# Patient Record
Sex: MALE | Race: WHITE | NOT HISPANIC OR LATINO | Employment: FULL TIME | URBAN - METROPOLITAN AREA
[De-identification: names, ages, dates, MRNs, and addresses within clinical notes are randomized per-mention and may not be internally consistent; named-entity substitution may affect disease eponyms.]

---

## 2017-11-27 ENCOUNTER — GENERIC CONVERSION - ENCOUNTER (OUTPATIENT)
Dept: OTHER | Facility: OTHER | Age: 37
End: 2017-11-27

## 2018-02-06 ENCOUNTER — ANESTHESIA EVENT (OUTPATIENT)
Dept: PERIOP | Facility: HOSPITAL | Age: 38
DRG: 470 | End: 2018-02-06
Payer: COMMERCIAL

## 2018-02-06 ENCOUNTER — TRANSCRIBE ORDERS (OUTPATIENT)
Dept: ADMINISTRATIVE | Facility: HOSPITAL | Age: 38
End: 2018-02-06

## 2018-02-06 ENCOUNTER — HOSPITAL ENCOUNTER (OUTPATIENT)
Dept: RADIOLOGY | Facility: HOSPITAL | Age: 38
Discharge: HOME | End: 2018-02-06
Attending: ORTHOPAEDIC SURGERY
Payer: COMMERCIAL

## 2018-02-06 ENCOUNTER — APPOINTMENT (OUTPATIENT)
Dept: LAB | Facility: HOSPITAL | Age: 38
End: 2018-02-06
Attending: ORTHOPAEDIC SURGERY
Payer: COMMERCIAL

## 2018-02-06 ENCOUNTER — HOSPITAL ENCOUNTER (OUTPATIENT)
Dept: NON INVASIVE DIAGNOSTICS | Facility: HOSPITAL | Age: 38
Discharge: HOME | End: 2018-02-06
Attending: ORTHOPAEDIC SURGERY
Payer: COMMERCIAL

## 2018-02-06 ENCOUNTER — APPOINTMENT (OUTPATIENT)
Dept: PREADMISSION TESTING | Facility: HOSPITAL | Age: 38
End: 2018-02-06
Payer: COMMERCIAL

## 2018-02-06 DIAGNOSIS — M17.11 OSTEOARTHRITIS OF RIGHT KNEE, UNSPECIFIED OSTEOARTHRITIS TYPE: ICD-10-CM

## 2018-02-06 DIAGNOSIS — Z01.818 PREOP EXAMINATION: Primary | ICD-10-CM

## 2018-02-06 DIAGNOSIS — Z01.818 PREOP EXAMINATION: ICD-10-CM

## 2018-02-06 LAB
ALBUMIN SERPL BCP-MCNC: 4.2 G/DL (ref 3.5–5)
ALP SERPL-CCNC: 89 U/L (ref 46–116)
ALT SERPL W P-5'-P-CCNC: 48 U/L (ref 12–78)
ANION GAP SERPL CALCULATED.3IONS-SCNC: 9 MMOL/L (ref 4–13)
AST SERPL W P-5'-P-CCNC: 25 U/L (ref 5–45)
ATRIAL RATE: 86 BPM
BASOPHILS # BLD AUTO: 0.03 THOUSANDS/ΜL (ref 0–0.1)
BASOPHILS NFR BLD AUTO: 1 % (ref 0–1)
BILIRUB SERPL-MCNC: 0.52 MG/DL (ref 0.2–1)
BILIRUB UR QL STRIP: NEGATIVE
BUN SERPL-MCNC: 13 MG/DL (ref 5–25)
CALCIUM SERPL-MCNC: 9.5 MG/DL (ref 8.3–10.1)
CHLORIDE SERPL-SCNC: 100 MMOL/L (ref 100–108)
CLARITY UR: CLEAR
CO2 SERPL-SCNC: 30 MMOL/L (ref 21–32)
COLOR UR: NORMAL
CREAT SERPL-MCNC: 0.91 MG/DL (ref 0.6–1.3)
EOSINOPHIL # BLD AUTO: 0.08 THOUSAND/ΜL (ref 0–0.61)
EOSINOPHIL NFR BLD AUTO: 2 % (ref 0–6)
ERYTHROCYTE [DISTWIDTH] IN BLOOD BY AUTOMATED COUNT: 13 % (ref 11.6–15.1)
GFR SERPL CREATININE-BSD FRML MDRD: 106 ML/MIN/1.73SQ M
GLUCOSE SERPL-MCNC: 113 MG/DL (ref 65–140)
GLUCOSE UR STRIP-MCNC: NEGATIVE MG/DL
HCT VFR BLD AUTO: 42.2 % (ref 36.5–49.3)
HGB BLD-MCNC: 14.9 G/DL (ref 12–17)
HGB UR QL STRIP.AUTO: NEGATIVE
INR PPP: 0.98 (ref 0.86–1.16)
KETONES UR STRIP-MCNC: NEGATIVE MG/DL
LEUKOCYTE ESTERASE UR QL STRIP: NEGATIVE
LYMPHOCYTES # BLD AUTO: 1.59 THOUSANDS/ΜL (ref 0.6–4.47)
LYMPHOCYTES NFR BLD AUTO: 30 % (ref 14–44)
MCH RBC QN AUTO: 29.3 PG (ref 26.8–34.3)
MCHC RBC AUTO-ENTMCNC: 35.3 G/DL (ref 31.4–37.4)
MCV RBC AUTO: 83 FL (ref 82–98)
MONOCYTES # BLD AUTO: 0.3 THOUSAND/ΜL (ref 0.17–1.22)
MONOCYTES NFR BLD AUTO: 6 % (ref 4–12)
NEUTROPHILS # BLD AUTO: 3.39 THOUSANDS/ΜL (ref 1.85–7.62)
NEUTS SEG NFR BLD AUTO: 61 % (ref 43–75)
NITRITE UR QL STRIP: NEGATIVE
NRBC BLD AUTO-RTO: 0 /100 WBCS
P AXIS: 31 DEGREES
PH UR STRIP.AUTO: 6 [PH] (ref 4.5–8)
PLATELET # BLD AUTO: 186 THOUSANDS/UL (ref 149–390)
PMV BLD AUTO: 11.7 FL (ref 8.9–12.7)
POTASSIUM SERPL-SCNC: 3.5 MMOL/L (ref 3.5–5.3)
PR INTERVAL: 148 MS
PROT SERPL-MCNC: 8 G/DL (ref 6.4–8.2)
PROT UR STRIP-MCNC: NEGATIVE MG/DL
PROTHROMBIN TIME: 13 SECONDS (ref 12.1–14.4)
QRS AXIS: 24 DEGREES
QRSD INTERVAL: 78 MS
QT INTERVAL: 360 MS
QTC INTERVAL: 430 MS
RBC # BLD AUTO: 5.09 MILLION/UL (ref 3.88–5.62)
SODIUM SERPL-SCNC: 139 MMOL/L (ref 136–145)
SP GR UR STRIP.AUTO: 1.01 (ref 1–1.03)
T WAVE AXIS: 17 DEGREES
UROBILINOGEN UR QL STRIP.AUTO: 0.2 E.U./DL
VENTRICULAR RATE: 86 BPM
WBC # BLD AUTO: 5.39 THOUSAND/UL (ref 4.31–10.16)

## 2018-02-06 PROCEDURE — 80053 COMPREHEN METABOLIC PANEL: CPT

## 2018-02-06 PROCEDURE — 93010 ELECTROCARDIOGRAM REPORT: CPT | Performed by: INTERNAL MEDICINE

## 2018-02-06 PROCEDURE — 85610 PROTHROMBIN TIME: CPT

## 2018-02-06 PROCEDURE — 81003 URINALYSIS AUTO W/O SCOPE: CPT | Performed by: ORTHOPAEDIC SURGERY

## 2018-02-06 PROCEDURE — 71046 X-RAY EXAM CHEST 2 VIEWS: CPT

## 2018-02-06 PROCEDURE — 36415 COLL VENOUS BLD VENIPUNCTURE: CPT

## 2018-02-06 PROCEDURE — 93005 ELECTROCARDIOGRAM TRACING: CPT

## 2018-02-06 PROCEDURE — 85025 COMPLETE CBC W/AUTO DIFF WBC: CPT

## 2018-02-06 RX ORDER — SODIUM CHLORIDE 9 MG/ML
125 INJECTION, SOLUTION INTRAVENOUS CONTINUOUS
Status: CANCELLED | OUTPATIENT
Start: 2018-03-02

## 2018-02-06 RX ORDER — IBUPROFEN 200 MG
TABLET ORAL EVERY 6 HOURS PRN
COMMUNITY
End: 2018-03-03 | Stop reason: HOSPADM

## 2018-02-06 RX ORDER — TRAMADOL HYDROCHLORIDE 50 MG/1
50 TABLET ORAL EVERY 6 HOURS PRN
COMMUNITY

## 2018-02-06 NOTE — ANESTHESIA PREPROCEDURE EVALUATION
Review of Systems/Medical History  Patient summary reviewed  Chart reviewed      Cardiovascular  Exercise tolerance: good,     Pulmonary  Negative pulmonary ROS        GI/Hepatic  Negative GI/hepatic ROS          Negative  ROS        Endo/Other  Negative endo/other ROS      GYN  Negative gynecology ROS          Hematology  Negative hematology ROS      Musculoskeletal    Arthritis     Neurology  Negative neurology ROS      Psychology   Negative psychology ROS              Physical Exam    Airway    Mallampati score: II  TM Distance: <3 FB  Neck ROM: full     Dental       Cardiovascular  Rhythm: regular, Rate: normal,     Pulmonary  Breath sounds clear to auscultation,     Other Findings        Anesthesia Plan  ASA Score- 1     Anesthesia Type- spinal with ASA Monitors  Additional Monitors:   Airway Plan:         Plan Factors- Patient instructed to abstain from smoking on day of procedure  Patient did not smoke on day of surgery  Induction- intravenous  Postoperative Plan- Plan for postoperative opioid use  Informed Consent- Anesthetic plan and risks discussed with patient

## 2018-02-06 NOTE — PRE-PROCEDURE INSTRUCTIONS
Pre-Surgery Instructions:   Medication Instructions    ibuprofen (MOTRIN) 200 mg tablet Patient was instructed by Physician and understands   traMADol (ULTRAM) 50 mg tablet Patient was instructed by Physician and understands  Pt given [facility] preop instructions and reviewed with pt  Pt given Chlorhexidine

## 2018-02-23 ENCOUNTER — TELEPHONE (OUTPATIENT)
Dept: OBGYN CLINIC | Facility: HOSPITAL | Age: 38
End: 2018-02-23

## 2018-02-23 NOTE — TELEPHONE ENCOUNTER
Pt returned my call for elective admission assessment  Med list, allergies and hx reviewed with pt  Pt reports he lives in a multi-level home with his wife, Migue Niño  Wife is planned caregiver when pt DCd  Pt reports he has 1 step into the home and 15 steps to the second level, pt plans for 1st floor setup  Pt has a step in tub  Pt reports he has crutches at home, he would need a bedside commode, walker and cane  Pt reports he is ambulating independently of assistive devices without a knee brace  Pt uses [facility] in [place], he reports he was told post-op DVT prophylaxis would be aspirin  Pt is planning to go home when [facility] and wants to do outpt PT at [facility]  Pt RAPT score 12  Pt enrolled in caresense  Pt denies having questions/concerns at this time

## 2018-03-02 ENCOUNTER — HOSPITAL ENCOUNTER (INPATIENT)
Facility: HOSPITAL | Age: 38
LOS: 1 days | Discharge: HOME | DRG: 470 | End: 2018-03-03
Attending: ORTHOPAEDIC SURGERY | Admitting: ORTHOPAEDIC SURGERY
Payer: COMMERCIAL

## 2018-03-02 ENCOUNTER — ANESTHESIA (OUTPATIENT)
Dept: PERIOP | Facility: HOSPITAL | Age: 38
DRG: 470 | End: 2018-03-02
Payer: COMMERCIAL

## 2018-03-02 ENCOUNTER — APPOINTMENT (INPATIENT)
Dept: RADIOLOGY | Facility: HOSPITAL | Age: 38
DRG: 470 | End: 2018-03-02
Payer: COMMERCIAL

## 2018-03-02 DIAGNOSIS — M17.11 PRIMARY OSTEOARTHRITIS OF RIGHT KNEE: Primary | ICD-10-CM

## 2018-03-02 LAB
ABO GROUP BLD: NORMAL
BLD GP AB SCN SERPL QL: NEGATIVE
RH BLD: NEGATIVE
SPECIMEN EXPIRATION DATE: NORMAL

## 2018-03-02 PROCEDURE — C1776 JOINT DEVICE (IMPLANTABLE): HCPCS | Performed by: ORTHOPAEDIC SURGERY

## 2018-03-02 PROCEDURE — 86850 RBC ANTIBODY SCREEN: CPT | Performed by: ORTHOPAEDIC SURGERY

## 2018-03-02 PROCEDURE — 94762 N-INVAS EAR/PLS OXIMTRY CONT: CPT

## 2018-03-02 PROCEDURE — C1713 ANCHOR/SCREW BN/BN,TIS/BN: HCPCS | Performed by: ORTHOPAEDIC SURGERY

## 2018-03-02 PROCEDURE — 97163 PT EVAL HIGH COMPLEX 45 MIN: CPT

## 2018-03-02 PROCEDURE — G8979 MOBILITY GOAL STATUS: HCPCS

## 2018-03-02 PROCEDURE — 86900 BLOOD TYPING SEROLOGIC ABO: CPT | Performed by: ORTHOPAEDIC SURGERY

## 2018-03-02 PROCEDURE — 73560 X-RAY EXAM OF KNEE 1 OR 2: CPT

## 2018-03-02 PROCEDURE — 86901 BLOOD TYPING SEROLOGIC RH(D): CPT | Performed by: ORTHOPAEDIC SURGERY

## 2018-03-02 PROCEDURE — G8978 MOBILITY CURRENT STATUS: HCPCS

## 2018-03-02 PROCEDURE — 0SRC0J9 REPLACEMENT OF RIGHT KNEE JOINT WITH SYNTHETIC SUBSTITUTE, CEMENTED, OPEN APPROACH: ICD-10-PCS | Performed by: ORTHOPAEDIC SURGERY

## 2018-03-02 DEVICE — TIBIAL BEARING INSERT - PS
Type: IMPLANTABLE DEVICE | Site: KNEE | Status: FUNCTIONAL
Brand: TRIATHLON

## 2018-03-02 DEVICE — UNIVERSAL TIBIAL BASEPLATE
Type: IMPLANTABLE DEVICE | Site: KNEE | Status: FUNCTIONAL
Brand: TRIATHLON

## 2018-03-02 DEVICE — POSTERIOR STABILIZED FEMORAL
Type: IMPLANTABLE DEVICE | Site: KNEE | Status: FUNCTIONAL
Brand: TRIATHLON

## 2018-03-02 DEVICE — ASYMMETRIC PATELLA
Type: IMPLANTABLE DEVICE | Site: KNEE | Status: FUNCTIONAL
Brand: TRIATHLON

## 2018-03-02 DEVICE — IMPLANTABLE DEVICE: Type: IMPLANTABLE DEVICE | Site: KNEE | Status: FUNCTIONAL

## 2018-03-02 RX ORDER — PROPOFOL 10 MG/ML
INJECTION, EMULSION INTRAVENOUS CONTINUOUS PRN
Status: DISCONTINUED | OUTPATIENT
Start: 2018-03-02 | End: 2018-03-02 | Stop reason: SURG

## 2018-03-02 RX ORDER — VANCOMYCIN HYDROCHLORIDE 1 G/200ML
1000 INJECTION, SOLUTION INTRAVENOUS ONCE
Status: COMPLETED | OUTPATIENT
Start: 2018-03-02 | End: 2018-03-02

## 2018-03-02 RX ORDER — MORPHINE SULFATE 0.5 MG/ML
INJECTION, SOLUTION EPIDURAL; INTRATHECAL; INTRAVENOUS AS NEEDED
Status: DISCONTINUED | OUTPATIENT
Start: 2018-03-02 | End: 2018-03-02 | Stop reason: SURG

## 2018-03-02 RX ORDER — TRAMADOL HYDROCHLORIDE 50 MG/1
50 TABLET ORAL EVERY 6 HOURS PRN
Status: DISCONTINUED | OUTPATIENT
Start: 2018-03-03 | End: 2018-03-03 | Stop reason: HOSPADM

## 2018-03-02 RX ORDER — BUPIVACAINE HYDROCHLORIDE 7.5 MG/ML
INJECTION, SOLUTION INTRASPINAL AS NEEDED
Status: DISCONTINUED | OUTPATIENT
Start: 2018-03-02 | End: 2018-03-02 | Stop reason: SURG

## 2018-03-02 RX ORDER — SODIUM CHLORIDE, SODIUM LACTATE, POTASSIUM CHLORIDE, CALCIUM CHLORIDE 600; 310; 30; 20 MG/100ML; MG/100ML; MG/100ML; MG/100ML
125 INJECTION, SOLUTION INTRAVENOUS CONTINUOUS
Status: DISCONTINUED | OUTPATIENT
Start: 2018-03-02 | End: 2018-03-03 | Stop reason: HOSPADM

## 2018-03-02 RX ORDER — CELECOXIB 200 MG/1
200 CAPSULE ORAL DAILY
Status: DISCONTINUED | OUTPATIENT
Start: 2018-03-03 | End: 2018-03-03 | Stop reason: HOSPADM

## 2018-03-02 RX ORDER — MAGNESIUM HYDROXIDE/ALUMINUM HYDROXICE/SIMETHICONE 120; 1200; 1200 MG/30ML; MG/30ML; MG/30ML
20 SUSPENSION ORAL EVERY 6 HOURS PRN
Status: DISCONTINUED | OUTPATIENT
Start: 2018-03-02 | End: 2018-03-03 | Stop reason: HOSPADM

## 2018-03-02 RX ORDER — NALBUPHINE HCL 10 MG/ML
5 AMPUL (ML) INJECTION
Status: DISCONTINUED | OUTPATIENT
Start: 2018-03-02 | End: 2018-03-03 | Stop reason: HOSPADM

## 2018-03-02 RX ORDER — METHOCARBAMOL 500 MG/1
500 TABLET, FILM COATED ORAL EVERY 6 HOURS PRN
Status: DISCONTINUED | OUTPATIENT
Start: 2018-03-02 | End: 2018-03-03 | Stop reason: HOSPADM

## 2018-03-02 RX ORDER — KETOROLAC TROMETHAMINE 30 MG/ML
30 INJECTION, SOLUTION INTRAMUSCULAR; INTRAVENOUS EVERY 6 HOURS PRN
Status: DISCONTINUED | OUTPATIENT
Start: 2018-03-02 | End: 2018-03-03 | Stop reason: HOSPADM

## 2018-03-02 RX ORDER — SENNOSIDES 8.6 MG
1 TABLET ORAL DAILY
Status: DISCONTINUED | OUTPATIENT
Start: 2018-03-03 | End: 2018-03-03 | Stop reason: HOSPADM

## 2018-03-02 RX ORDER — OXYCODONE HYDROCHLORIDE 5 MG/1
5 TABLET ORAL EVERY 4 HOURS PRN
Status: DISCONTINUED | OUTPATIENT
Start: 2018-03-03 | End: 2018-03-03 | Stop reason: HOSPADM

## 2018-03-02 RX ORDER — ACETAMINOPHEN 325 MG/1
650 TABLET ORAL EVERY 6 HOURS PRN
Status: DISCONTINUED | OUTPATIENT
Start: 2018-03-02 | End: 2018-03-03 | Stop reason: HOSPADM

## 2018-03-02 RX ORDER — ONDANSETRON 2 MG/ML
INJECTION INTRAMUSCULAR; INTRAVENOUS AS NEEDED
Status: DISCONTINUED | OUTPATIENT
Start: 2018-03-02 | End: 2018-03-02 | Stop reason: SURG

## 2018-03-02 RX ORDER — BUPIVACAINE HYDROCHLORIDE AND EPINEPHRINE 5; 5 MG/ML; UG/ML
INJECTION, SOLUTION PERINEURAL AS NEEDED
Status: DISCONTINUED | OUTPATIENT
Start: 2018-03-02 | End: 2018-03-02 | Stop reason: HOSPADM

## 2018-03-02 RX ORDER — OXYCODONE HYDROCHLORIDE AND ACETAMINOPHEN 5; 325 MG/1; MG/1
1 TABLET ORAL EVERY 4 HOURS PRN
Status: DISCONTINUED | OUTPATIENT
Start: 2018-03-02 | End: 2018-03-03 | Stop reason: HOSPADM

## 2018-03-02 RX ORDER — CALCIUM CARBONATE 200(500)MG
1000 TABLET,CHEWABLE ORAL DAILY PRN
Status: DISCONTINUED | OUTPATIENT
Start: 2018-03-02 | End: 2018-03-03 | Stop reason: HOSPADM

## 2018-03-02 RX ORDER — MAGNESIUM HYDROXIDE 1200 MG/15ML
LIQUID ORAL AS NEEDED
Status: DISCONTINUED | OUTPATIENT
Start: 2018-03-02 | End: 2018-03-02 | Stop reason: HOSPADM

## 2018-03-02 RX ORDER — ONDANSETRON 2 MG/ML
4 INJECTION INTRAMUSCULAR; INTRAVENOUS EVERY 4 HOURS PRN
Status: DISCONTINUED | OUTPATIENT
Start: 2018-03-02 | End: 2018-03-03 | Stop reason: HOSPADM

## 2018-03-02 RX ORDER — ONDANSETRON 2 MG/ML
4 INJECTION INTRAMUSCULAR; INTRAVENOUS EVERY 6 HOURS PRN
Status: DISCONTINUED | OUTPATIENT
Start: 2018-03-02 | End: 2018-03-02 | Stop reason: HOSPADM

## 2018-03-02 RX ORDER — NALOXONE HYDROCHLORIDE 0.4 MG/ML
0.1 INJECTION, SOLUTION INTRAMUSCULAR; INTRAVENOUS; SUBCUTANEOUS
Status: DISCONTINUED | OUTPATIENT
Start: 2018-03-02 | End: 2018-03-03 | Stop reason: HOSPADM

## 2018-03-02 RX ORDER — FENTANYL CITRATE 50 UG/ML
INJECTION, SOLUTION INTRAMUSCULAR; INTRAVENOUS AS NEEDED
Status: DISCONTINUED | OUTPATIENT
Start: 2018-03-02 | End: 2018-03-02 | Stop reason: SURG

## 2018-03-02 RX ORDER — SODIUM CHLORIDE 9 MG/ML
125 INJECTION, SOLUTION INTRAVENOUS CONTINUOUS
Status: DISCONTINUED | OUTPATIENT
Start: 2018-03-02 | End: 2018-03-03 | Stop reason: HOSPADM

## 2018-03-02 RX ORDER — PROMETHAZINE HYDROCHLORIDE 25 MG/ML
12.5 INJECTION, SOLUTION INTRAMUSCULAR; INTRAVENOUS EVERY 4 HOURS PRN
Status: DISCONTINUED | OUTPATIENT
Start: 2018-03-02 | End: 2018-03-02 | Stop reason: HOSPADM

## 2018-03-02 RX ORDER — MIDAZOLAM HYDROCHLORIDE 1 MG/ML
INJECTION INTRAMUSCULAR; INTRAVENOUS AS NEEDED
Status: DISCONTINUED | OUTPATIENT
Start: 2018-03-02 | End: 2018-03-02 | Stop reason: SURG

## 2018-03-02 RX ORDER — KETOROLAC TROMETHAMINE 30 MG/ML
30 INJECTION, SOLUTION INTRAMUSCULAR; INTRAVENOUS EVERY 6 HOURS PRN
Status: DISCONTINUED | OUTPATIENT
Start: 2018-03-03 | End: 2018-03-03 | Stop reason: HOSPADM

## 2018-03-02 RX ORDER — PANTOPRAZOLE SODIUM 40 MG/1
40 TABLET, DELAYED RELEASE ORAL DAILY
Status: DISCONTINUED | OUTPATIENT
Start: 2018-03-03 | End: 2018-03-03 | Stop reason: HOSPADM

## 2018-03-02 RX ORDER — OXYCODONE HYDROCHLORIDE 5 MG/1
10 TABLET ORAL EVERY 4 HOURS PRN
Status: DISCONTINUED | OUTPATIENT
Start: 2018-03-03 | End: 2018-03-03 | Stop reason: HOSPADM

## 2018-03-02 RX ORDER — TRANEXAMIC ACID 100 MG/ML
INJECTION, SOLUTION INTRAVENOUS AS NEEDED
Status: DISCONTINUED | OUTPATIENT
Start: 2018-03-02 | End: 2018-03-02 | Stop reason: SURG

## 2018-03-02 RX ORDER — ASPIRIN 81 MG/1
81 TABLET, CHEWABLE ORAL 2 TIMES DAILY
Status: DISCONTINUED | OUTPATIENT
Start: 2018-03-02 | End: 2018-03-03 | Stop reason: HOSPADM

## 2018-03-02 RX ADMIN — SODIUM CHLORIDE, SODIUM LACTATE, POTASSIUM CHLORIDE, AND CALCIUM CHLORIDE 125 ML/HR: .6; .31; .03; .02 INJECTION, SOLUTION INTRAVENOUS at 15:55

## 2018-03-02 RX ADMIN — KETOROLAC TROMETHAMINE 30 MG: 30 INJECTION, SOLUTION INTRAMUSCULAR at 17:33

## 2018-03-02 RX ADMIN — BUPIVACAINE HYDROCHLORIDE IN DEXTROSE 1.8 ML: 7.5 INJECTION, SOLUTION SUBARACHNOID at 13:19

## 2018-03-02 RX ADMIN — CEFAZOLIN SODIUM 1000 MG: 1 SOLUTION INTRAVENOUS at 20:50

## 2018-03-02 RX ADMIN — MIDAZOLAM HYDROCHLORIDE 2 MG: 1 INJECTION, SOLUTION INTRAMUSCULAR; INTRAVENOUS at 13:18

## 2018-03-02 RX ADMIN — SODIUM CHLORIDE 125 ML/HR: 0.9 INJECTION, SOLUTION INTRAVENOUS at 10:08

## 2018-03-02 RX ADMIN — TRANEXAMIC ACID 1 G: 100 INJECTION, SOLUTION INTRAVENOUS at 13:35

## 2018-03-02 RX ADMIN — VANCOMYCIN HYDROCHLORIDE 1500 MG: 1 INJECTION, SOLUTION INTRAVENOUS at 13:40

## 2018-03-02 RX ADMIN — PROPOFOL 100 MCG/KG/MIN: 10 INJECTION, EMULSION INTRAVENOUS at 13:22

## 2018-03-02 RX ADMIN — MIDAZOLAM HYDROCHLORIDE 2 MG: 1 INJECTION, SOLUTION INTRAMUSCULAR; INTRAVENOUS at 13:09

## 2018-03-02 RX ADMIN — TRANEXAMIC ACID 1 G: 100 INJECTION, SOLUTION INTRAVENOUS at 14:40

## 2018-03-02 RX ADMIN — SODIUM CHLORIDE: 0.9 INJECTION, SOLUTION INTRAVENOUS at 14:00

## 2018-03-02 RX ADMIN — CEFAZOLIN SODIUM 2000 MG: 2 SOLUTION INTRAVENOUS at 13:11

## 2018-03-02 RX ADMIN — FENTANYL CITRATE 100 MCG: 50 INJECTION, SOLUTION INTRAMUSCULAR; INTRAVENOUS at 13:05

## 2018-03-02 RX ADMIN — DEXAMETHASONE SODIUM PHOSPHATE 4 MG: 10 INJECTION INTRAMUSCULAR; INTRAVENOUS at 13:05

## 2018-03-02 RX ADMIN — MORPHINE SULFATE 0.15 MG: 0.5 INJECTION, SOLUTION EPIDURAL; INTRATHECAL; INTRAVENOUS at 13:19

## 2018-03-02 RX ADMIN — ASPIRIN 81 MG 81 MG: 81 TABLET ORAL at 17:33

## 2018-03-02 RX ADMIN — HYDROMORPHONE HYDROCHLORIDE 0.5 MG: 1 INJECTION, SOLUTION INTRAMUSCULAR; INTRAVENOUS; SUBCUTANEOUS at 20:00

## 2018-03-02 RX ADMIN — ONDANSETRON HYDROCHLORIDE 4 MG: 2 INJECTION, SOLUTION INTRAVENOUS at 13:05

## 2018-03-02 NOTE — ANESTHESIA PROCEDURE NOTES
Spinal Block    Patient location during procedure: OR  Start time: 3/2/2018 1:15 PM  End time: 3/2/2018 1:20 PM  Reason for block: procedure for pain and at surgeon's request  Staffing  Anesthesiologist: Michel Elaine  Resident/CRNA: Carrie Call  Performed: resident/CRNA   Preanesthetic Checklist  Completed: patient identified, site marked, surgical consent, pre-op evaluation, timeout performed, IV checked, risks and benefits discussed and monitors and equipment checked  Spinal Block  Patient position: sitting  Prep: ChloraPrep  Patient monitoring: cardiac monitor and frequent blood pressure checks  Approach: midline  Location: L3-4  Injection technique: single-shot  Needle  Needle type: pencil-tip   Needle gauge: 25 G  Needle length: 10 cm  Assessment  Sensory level: [id number] Assessment:  negative aspiration for heme, no paresthesia on injection and positive aspiration for clear CSF    Post-procedure:  adhesive bandage applied, pressure dressing applied, secured with tape, site cleaned and sterile dressing applied

## 2018-03-02 NOTE — OP NOTE
OPERATIVE REPORT  PATIENT NAME: Nithin Fontanez    :  1980  MRN: [id number]  Pt Location: [facility] OR ROOM 02    SURGERY DATE: 3/2/2018    Operative Note    Nithin Fontanez  3/2/2018    Pre-op Diagnosis:   Right Knee Osteoarthritis with history of prior ACL reconstruction    Post-op Diagnosis:   Same    Procedure:  Right Knee Replacement    Surgeon:  Chaka De Leon MD    Assistants:  MIRANDA Oquendo CFA       Anesthesia:  Spinal     Components:     Implant Name Type Inv  Item Serial No   Lot No  LRB No  Used   CEMENT BN 40 GM PALACOS HVG RADIOPAQUE - [id number]  CEMENT BN 40 GM PALACOS HVG RADIOPAQUE  Gill [id number] Right 2   COMPONENT FEM CMNT SZ 5 RT PS TRIATHLON - [id number]  COMPONENT FEM CMNT SZ 5 RT PS TRIATHLON  [employer] [id number] Right 1   BASEPLATE TIBIAL CMNT SZ 5 TS UNV TRIATHLON - [id number]  BASEPLATE TIBIAL CMNT SZ 5 TS UNV TRIATHLON  [employer] [id number] Right 1   COMPONENT PATELLAR ASYM 29 X 9MM TRIATHLON X3 - [id number]  COMPONENT PATELLAR ASYM 29 X 9MM TRIATHLON X3  [employer] [id number] Right 1   INSERT TIBIAL 13MM SZ 5 PS TRIATHLON X3 - [id number]   INSERT TIBIAL 13MM SZ 5 PS TRIATHLON X3   [employer] [id number] Right 1          INDICATION: Nithin Fontanez is a 42 y o  male with advanced osteoarthritis of the Right knee, which was refractory to nonoperative therapy  He understood the risks and benefits of knee replacement and wished to proceed  DESCRIPTION OF PROCEDURE: On 3/2/2018 the patient was brought to the holding area  The identity and surgical site were confirmed by him and the surgeon  Perioperative intravenous antibiotics were given  Anesthesia was administered by the anesthesia team  His Right lower extremity was prepped and draped in the usual sterile manner  An anterior midline incision staying at least 7cm lateral to his far medial incision was followed by a medial parapatellar arthrotomy  Osteophytes were removed    He had complete cartilage loss medially  The ACL and PCL were both resected  The extramedullary guide was used to make the proximal tibial cut  We checked the alignment and found it to be satisfactory  Then the intramedullary guide was placed in the femoral canal allowing us to make the distal femoral cut in 5 degrees of valgus    With the knee in extension, we were able to place an extension block with full extension and good soft tissue tension and no further releases were needed  Then the femur was appropriately sized based on the femoral dimensions the flexion/extension gap matching  The rotation was cross-referenced against the transepicondylar axis and found to be satisfactory  With the cutting block in place, we were able to place a similarly sized flexion block with good soft tissue tension  Therefore, femoral cuts were made  Posterior osteophytes were removed  The medial and lateral menisci were removed  A posterior capsular release was made  The patella was resurfaced and the size was chosen based on optimal coverage of the cut surface, and a caliper was used before and after the cut to ensure optimal thickness  At this point, the implants were trialed and the knee was able to come to full extension with good flexion and appropriate soft tissue tension throughout the range of motion and good patellar stability  This would be rechecked after the components are cemented  This was checked with the tourniquet down and all bleeders were carefully coagulated  The tibia was prepared in a proper amount of external rotation and the tibial size was chosen to achieve optimal bony coverage without overhang  The tourniquet was inflated after coagulating all bleeders and the knee was irrigated copiously and the bony surfaces were dried thoroughly  Cement was mixed and applied to the backside of the components  The tibia was cemented first, followed by the femur  Excess cement was removed  Then a trial spacer was placed   The knee was reduced and pressurized in extension  The patella was then cemented  We brought the knee back into flexion to make sure there was no excess cement and we trialed the spacer options again  The 13mm PS allowed the knee to have full extension, good flexion and good soft tissue tension throughout the range of motion with good patellar tracking  The 16 was clearly too tight in extension and flexion and lacked full ROM; the 11 had v/v instability that was not acceptable  Therefore, the 13PS was chosen and snapped into place  The knee was irrigated copiously with pulse lavage  Bleeders were again carefully coagulated  A periarticular injection was injected for postoperative analgesia  The arthrotomy was closed with #1 Vicryl suture  The skin was closed using Vicryl sutures, staples and a sterile dressing  The patient was then awakened and taken to the recovery room  The assistance of JIM Hanson was essential as no qualified resident assistance was available  XRAY REPORT: Postoperative x-rays including an AP and lateral of the Right knee from 3/2/2018 were checked and revealed a Right knee replacement in good alignment with no fracture or dislocation              Joana Beach MD     Date: 3/2/2018  Time: 6:03 PM

## 2018-03-02 NOTE — DISCHARGE INSTRUCTIONS
[facility]   Post Operative Joint Replacement Instructions   Dr Merle Garcia Office [address] [phone]     Remove dressing post op day 7  Home Health care will remove the staples/sutures post op day 14  MEDICATIONS      *  Enteric Coated Aspirin 81 mg:  Start taking this twice daily on discharge and continue for 6 weeks  Continue as you were in the hospital/rehab facility     * Iron: Continue the iron supplement twice daily  If you experience nausea or constipation with the medicine discontinue its use  Contact us if the nausea persists  * Pain Medications: Your prescriptions may run out before you return for your next visit  Please give us two regular office day's notice before you run out, to prevent any problems of not having pain medicine  Be advised that prescriptions for Percocet and OxyContin cannot be phoned to your pharmacy  They will need to be picked up at our office  Please refrain from using alcohol with your pain medicines  Percocet contains 325 mg of Tylenol  You should not exceed 3000 mg of Tylenol in a day  Please be careful to not overdose the Tylenol  * Herbal Medicines: Please hold all these preparations until after your first post-operative visit  Unless specifically directed otherwise  ACTIVITY   * Your weight bearing status is: as tolerated     * If you have been furnished with an assistive device  Please feel free to try to wean from using it under the supervision of your therapist      * You may participate in activity as tolerated  It is likely that you will tire more easily for a time after surgery  Please rest when you need it to help your healing process  * Stairs are not restricted for you  Please climb stairs as instructed by your physical therapist      * For hip and knee replacement patients please elevate your leg or legs while resting  This is important to prevent lower leg swelling       * When you are back at home you will likely have physical therapy three times a week  On the days you do not have formal therapy please perform the home exercises given to you  * If you had a hip replacement, please follow the safety precautions given to you by the nurse or therapist taking care of you  * Immediately following the surgery you are not permitted to drive until cleared by your surgeon  This typically does not happen until your first visit after surgery  * Knee replacement patients may be passengers in a vehicle following their discharge from the hospital      * Hip replacement patients are not allowed in a vehicle, except for your trip home and your first follow up visit  Please follow these guidelines unless specifically instructed to start outpatient therapy at an earlier time  * Please do not perform lifting tasks or strenuous domestic activities  * If you currently are employed, you are off work until cleared by your surgeon  Everyone's ability to return to work is determined by his or her abilities  Your return to work may take a few weeks to a few months  * Sexual activities are permitted as tolerated  NORMAL FINDINGS   * Numbness along the incision     * Mechanical click of a knee replacement  * Your incision area will feel warm  WOUND CARE   * It is OK to shower once there is no spotting or drainage for a full 24 hours  Please do not submerge the incision into a pool, bath or hot tub until after your 1st post-operative visit  * Please do not disturb your staples, sutures, or the scabs  It promotes better healing and smaller scars  * Cover the incision with gauze dressing until there is no further drainage  * You may leave the incision open to the air when there is no discharge left on the gauze at changing time  Once this occurs you may shower  * Please be generous with the use of ice  It is a very good remedy  Apply ice for only twenty minutes at a time   You may use it every hour  It is recommended to ice before and after anticipated activities, which includes exercise and physical therapy  * Wear your knee-high pressure stockings every day  They may be removed for sleep at night  Continue to wear them until you are seen for your first follow up  * For knee and hip replacements; your staples will be removed about 14 days after your surgery date  Home nurses and physical therapists typically remove them  If they are unable to, please call our office to have us do it for you  FOLLOW UP   * You should have a scheduled visit with your surgeon scheduled for three to four weeks after surgery  If you do not remember your appointment date and time, or if you are sure you do not have one, please call to set one up  * Please inform the office if you experience one of the following:    - Fever that is not responding to Tylenol and is above 101 degrees    - Redness of the skin that is increasing in area    - Your incision is soaking the dressings with drainage or blood    - You're unable to bear weight on your operative leg or legs

## 2018-03-02 NOTE — PLAN OF CARE
Problem: PHYSICAL THERAPY ADULT  Goal: Performs mobility at highest level of function for planned discharge setting  See evaluation for individualized goals  Treatment/Interventions: Functional transfer training, LE strengthening/ROM, Elevations, Therapeutic exercise, Endurance training, Patient/family training, Bed mobility, Gait training, Spoke to nursing, Family  Equipment Recommended: Obdulio Lucas (STEPHEN)       See flowsheet documentation for full assessment, interventions and recommendations  Outcome: Progressing  Prognosis: Good  Problem List: Decreased strength, Decreased range of motion, Decreased endurance, Impaired balance, Decreased mobility, Impaired sensation  Assessment: Pt  37 y o male s/p R TKR 2* to severe DJD  Pt referred to PT for mobility assessment & D/C planning  PTA, pt reports being I w/o AD  On eval, pt demonstrate dec mobility, balance, endurance & amb 2* to RLE weakness  Pt require minAx1 for most functional mobility + cues for techniques  Gait deviations as above, slow w/ difficulty advancing RLE but no gross LOB noted  No dizziness reported t/o session  Will continue skilled PT to improve function & safety  Pt plans to return home at D/C  At this time, will anticipate good progress in PT for safe D/C to home  Will recommend HHPT, RW & family support at D/C  Pt will need to achieve PT goals prior to D/C for safe return to home  CM to follow  Pt tolerated OOB in chair at end of session w/o issues  Call bell in reach  Nsg staff to continue to mobilized pt (OOB in chair for all meals & ambulate in room/unit) as tolerated to prevent further decline in function  Nsg notified  Barriers to Discharge: None     Recommendation: Home PT, Home with family support, Outpatient PT (depending on progress)     PT - OK to Discharge: No (pt to achieve PT goals prior to D/C home)    See flowsheet documentation for full assessment

## 2018-03-02 NOTE — CONSULTS
Consultation - Internal Medicine  Katiuska Fuller 42 y o  male MRN: [id number]  Unit/Bed#: E2 -01 Encounter: [id number]      Impression :-    This is a very delightful  42 y o  male patient, who has undergone elective right knee replacement earlier today by Dr Tyler Calloway  Advanced degenerative joint disease right knee with previous multiple joint surgeries  Ambulatory dysfunction  Postoperative indwelling Zhu catheter  Suspected obstructive sleep apnea  Recommendation: -    Patient is currently recuperating very well he has no pain symptoms and states that his operated right leg is starting to move but he still has no discomfort  Patient was educated on the pain scale and to utilize it to quantitate and help with his analgesics  Generally he found much relieved by tramadol, he can utilize that besides other analgesics  I have reviewed patients medications as initiated on post operative orders by the primary team  Recommend  monitoring closely for any hypoxemia / respiratory insufficieny related to narcotics and to reduce doses and frequency of narcotics if necessary  Maintain Intravenous Fluids for next 24 -48 hours, till patient able to reliably keep meals and meds in  Suggest  Zofran ODT 4 mg sublingually PRN for control of post operative nausea  Patient encouraged to  use Incentive spirometry q 15 minutes while awake to minimize risk of postoperative atelectasis and pneumonia  Patient verbalized to understand and fully comprehend  Recommend DVT prophylaxis with use of Venodyne compression boots  If any factor X A inhibitor,  low molecule weight heparin or Coumadin is planned by the primary team, we will be happy to dose that  drug based on patient's hemostasis  Maintain ASA as antiplatelet drug per Ortho  Team  Use Proton Pump inhibitor to minimize risk of gastritis  Monitor for any tinnitus as an early sign of salicylism and check salicylate levels in that situation      Discontinue patient's Zhu catheter within next 24-48 hours in order  to minimize risk of catheter associated UTI  Please check patient's hemoglobin and hematocrit within next 24 hours to ensure that there is no acute blood loss anemia which would need any blood transfusion  We will follow this pleasant patient with your service closely and recommend necessary changes based on  further hospital course and diagnostics  Thank you, Dr Luis E Lerner , for your kind consultation  HISTORY OF PRESENT ILLNESS:    Reason for Consult:  Post operative management following right total knee replacement earlier today by Dr Luis E Lerner  HPI: Linh Armas is a 42 y o  male  [place] [facility] [employer], had multiple injuries to his right knee since his high school days  He states that he had 9 surgeries including arthroscopic ease and microfracture done by Dr Pola Andrade in the past   Over years his right knee had been increasingly getting stiff causing pain with simple activities  Initially pain used to be dull and achy around his knee joint, with prolonged standing and prolonged exertion but now even with simple activities he is unable to perform these tasks easily  Prolonged sitting standing are become increasingly hard  This has affected his activities of daily living as a   He used to take pain medications and tramadol use to help him but that also became ineffective  He followed with Dr Luis E Lerner and had imaging studies done  He was advised that due to his current symptoms, clinical evidence and radiographic abnormalities he would benefit with a knee replacement  After being cleared by his outpatient team he underwent the surgery earlier today and is not recuperating well  Patient's wife and mother-in-law are here to chair him up following surgery  The provide additional information    Patient denies any falls but relates that mostly he had sports injury used to love playing football in his high school  He also had a ACL tear of his left knee which does not bother him too much  He had surgery done on that too  Review of Systems   Constitutional:  No appetite change, denies chills, diaphoresis, fatigue or fever  HEENT:  Negative for congestion, sore throat and tinnitus  No visual complaints  Respiratory:  Negative cough, chest tightness, shortness of breath and wheezing  Cardiovascular:  Negative for chest pain and palpitations  Gastrointestinal: Negative for abdominal distention or pain, constipation, diarrhea and nausea  Endocrine: Negative for cold intolerance, heat intolerance, polydipsia and polyuria  Genitourinary:                Negative for  dysuria, flank pain  Tolerating Zhu without difficulty  Skin:   Negative for color change, pallor and rash  Neurological:   Negative for dizziness, tremors, seizures, syncope, facial asymmetry, speech difficulty, weakness, light-headedness, numbness and headaches  Hematological:  Musculoskeletal:  Psychiatric:  No h/o spontaneous bruising/bleeding  Joint pains as above  Negative for agitation, behavioral problems, confusion, decreased concentration, dysphoric mood and sleep disturbance  A complete system-based review of systems as discussed with patient is otherwise negative  PAST MEDICAL HISTORY:  Past Medical History:   Diagnosis Date    Arthritis     R TKR today 3/2/2018    Chronic pain disorder     Contact lens overwear, bilateral     History of heart murmur in childhood      Past Surgical History:   Procedure Laterality Date    ANTERIOR CRUCIATE LIGAMENT REPAIR Left     KNEE ARTHROSCOPY Right     KNEE SURGERY Right     with hardware       FAMILY HISTORY:  Patient's both parents are in [place], they had some joint problems and knee replacements      SOCIAL HISTORY:  History   Alcohol Use    14 4 oz/week    24 Cans of beer per week     Comment: per month     History   Drug Use No     History Smoking Status    Never Smoker   Smokeless Tobacco    Never Used       ALLERGIES:  No Known Allergies    MEDICATIONS:  All current active medications have been reviewed    Current Facility-Administered Medications   Medication Dose Route Frequency Provider Last Rate Last Dose    acetaminophen (TYLENOL) tablet 650 mg  650 mg Oral Q6H PRN Brandon Kay PA-C        aluminum-magnesium hydroxide-simethicone (MYLANTA) 200-200-20 mg/5 mL oral suspension 20 mL  20 mL Oral Q6H PRN Rena Collet, PA-C        aspirin chewable tablet 81 mg  81 mg Oral BID Brandon Kay PA-C        calcium carbonate (TUMS) chewable tablet 1,000 mg  1,000 mg Oral Daily PRN Rena Collet, PA-C        ceFAZolin (ANCEF) IVPB (premix) 1,000 mg  1,000 mg Intravenous Q8H Brandon Kay PA-C        [START ON 3/3/2018] celecoxib (CeleBREX) capsule 200 mg  200 mg Oral Daily Brandon Kay PA-C        [START ON 3/3/2018] HYDROmorphone (DILAUDID) injection 0 2 mg  0 2 mg Intravenous Q3H PRN Rena Collet, PA-C        HYDROmorphone (DILAUDID) injection 0 5 mg  0 5 mg Intravenous Q2H PRN Elaine Randolph MD        [START ON 3/3/2018] ketorolac (TORADOL) injection 30 mg  30 mg Intravenous Q6H PRN Rena Collet, PA-C        ketorolac (TORADOL) injection 30 mg  30 mg Intravenous Q6H PRN Elaine Randolph MD        lactated ringers infusion  125 mL/hr Intravenous Continuous Rena Collet, PA-C 125 mL/hr at 03/02/18 1555 125 mL/hr at 03/02/18 1555    methocarbamol (ROBAXIN) tablet 500 mg  500 mg Oral Q6H PRN Rena Collet, PA-C        [START ON 3/3/2018] multivitamin-minerals (CENTRUM) tablet 1 tablet  1 tablet Oral Daily Rena Collet, PA-C        nalbuphine (NUBAIN) injection 5 mg  5 mg Intravenous Q3H PRN Elaine Randolph MD        [facility]) injection 0 1 mg  0 1 mg Intravenous Q3 min PRN Elaine Randolph MD        ondansetron [facility]) injection 4 mg  4 mg Intravenous Q4H PRN Elaine Randolph MD        [START ON 3/3/2018] oxyCODONE (ROXICODONE) IR tablet 10 mg  10 mg Oral Q4H PRN Alfonzo Cali MIRANDA Kay        [START ON 3/3/2018] oxyCODONE (ROXICODONE) IR tablet 5 mg  5 mg Oral Q4H PRN Haroldo Fallon PA-C        oxyCODONE-acetaminophen (PERCOCET) 5-325 mg per tablet 1 tablet  1 tablet Oral Q4H PRN Fernie Guan MD        [START ON 3/3/2018] pantoprazole (PROTONIX) EC tablet 40 mg  40 mg Oral Daily Pita Gallagher        [START ON 3/3/2018] senna (SENOKOT) tablet 8 6 mg  1 tablet Oral Daily Brandon Kay PA-C        sodium chloride 0 9 % infusion  125 mL/hr Intravenous Continuous Anshul Healy DO   Stopped at 18 1555    [START ON 3/3/2018] traMADol (ULTRAM) tablet 50 mg  50 mg Oral Q6H PRN Haroldo Fallon PA-C           Prescriptions Prior to Admission   Medication    traMADol (ULTRAM) 50 mg tablet    ibuprofen (MOTRIN) 200 mg tablet           PHYSICAL EXAM:    Vitals:  HR:  [70-84] 74  Resp:  [14-20] 16  BP: (116-146)/(71-85) 121/73  SpO2:  [95 %-100 %] 95 %  Temp (24hrs), Av 8 °F (36 6 °C), Min:97 4 °F (36 3 °C), Max:98 4 °F (36 9 °C)  Current: Temperature: 97 5 °F (36 4 °C)  Body mass index is 33 72 kg/m²  General Appearance:    Awake, alert, cooperative, not in distress  Head:    Normocephalic, atraumatic   Eyes:    Pupils equal in size,conjunctiva/corneas clear, EOM's intact  Nose:   No evidence of epistaxis/ discharge from nares  Throat:   Lips, mucosa, and tongue normal    Neck:   no JVP  Lungs:     Bilateral air entry is broncho-alveolar and equal        No crepitation or rales  No pleural rub  Heart:    Regular rate and rhythm, S1S2 normal, no murmur, rub  Abdomen:     Soft, non-tender, no masses, no organomegaly   Genitalia:   Indwelling Zhu catheter  Clear urine +, No hematuria  Musculoskeletal:   Extremities appear normal, atraumatic, no cyanosis or edema  Surgical site on the right knee has clear dressing / no bleeding or hemorrhage apparent  Vascular:   2+ in Posterior tibialis / dorsalis pedis     Skin:   Skin color, texture, turgor normal, no rashes or lesions   Neurologic:   Oriented/ Awake/ facial symmetry maintained  Speech is intact  Muscle bulk and strength is equivocal in B/L Upper and lower extremities except on operated right lower limb  Light sensation is intact B/l LE  B/L [place] flexion is WNL  LABS, IMAGING, & OTHER STUDIES:  Lab Results:  I have personally reviewed pertinent labs  Lab Results   Component Value Date     02/06/2018     02/06/2018    CO2 30 02/06/2018    ANIONGAP 9 02/06/2018    BUN 13 02/06/2018    CREATININE 0 91 02/06/2018    EGFR 106 02/06/2018    GLUCOSE 113 02/06/2018    CALCIUM 9 5 02/06/2018    AST 25 02/06/2018    ALT 48 02/06/2018    ALKPHOS 89 02/06/2018    PROT 8 0 02/06/2018    BILITOT 0 52 02/06/2018     Lab Results   Component Value Date    WBC 5 39 02/06/2018    HGB 14 9 02/06/2018     02/06/2018       Lab Results   Component Value Date    INR 0 98 02/06/2018         Imaging Studies:   I have personally reviewed pertinent imaging study reports  Imaging:     Xr Chest Pa & Lateral    Result Date: 2/7/2018  Narrative: CHEST INDICATION: Z01 818: Encounter for other preprocedural examination M17 11: Unilateral primary osteoarthritis, right knee  History taken directly from the electronic ordering system  Preop for total knee replacement on March 2, 2018 COMPARISON: None VIEWS:  PA and lateral IMAGES:  2 FINDINGS: The cardiomediastinal silhouette is unremarkable  The lungs are clear  No pleural effusions  Bony thorax unremarkable  Impression: No active pulmonary disease    Workstation performed: [id number]     EZ Knee Right 1 Or 2 Views    Result Date: 3/2/2018  Narrative: Right knee INDICATION: Postop  COMPARISON: None VIEWS: AP and lateral IMAGES:  2 FINDINGS: Images demonstrate total knee arthroplasty which appears in satisfactory position  Anticipated post surgical changes are seen in the adjacent soft tissues  Impression: Postoperative changes as above    Workstation performed: [id number]       EKG, Pathology, and Other Studies:   I have personally reviewed pertinent reports  Ref Range & Units 2/6/18 1335   Ventricular Rate BPM 86    Atrial Rate BPM 86    WI Interval ms 148    QRSD Interval ms 78    QT Interval ms 360    QTC Interval ms 430    P Axis degrees 31    QRS Axis degrees 24    T Wave Axis degrees 17    Narrative     Normal sinus rhythm  Normal ECG  No previous ECGs available            Portions of the record may have been created with voice recognition software  Occasional wrong word or "sound a like" substitutions may have occurred due to the inherent limitations of voice recognition software  Read the chart carefully and recognize, using context, where substitutions have occurred

## 2018-03-02 NOTE — PHYSICAL THERAPY NOTE
PT EVALUATION    Pt  Name: Linh Armas  Pt  Age: 42 y o  Patient Active Problem List   Diagnosis    Osteoarthritis of right knee       Primary osteoarthritis of right knee [M17 11]    Past Medical History:   Diagnosis Date    Arthritis     R TKR today 3/2/2018    Chronic pain disorder     Contact lens overwear, bilateral     History of heart murmur in childhood        Past Surgical History:   Procedure Laterality Date    ANTERIOR CRUCIATE LIGAMENT REPAIR Left     KNEE ARTHROSCOPY Right     KNEE SURGERY Right     with hardware          03/02/18 1726   Note Type   Note type Eval only   Pain Assessment   Pain Score No Pain   Home Living   Type of [address] Two level; Able to live on main level with bedroom/bathroom;Stairs to enter without rails  (1+1STE)   [address] [place] [address]   Prior Function   Level of [place] Independent with ADLs and functional mobility  (w/o AD)   Lives With Spouse   Receives Help From Family   ADL Assistance Independent   Falls in the last 6 months 0   Restrictions/Precautions   Weight Bearing Precautions Per Order Yes   RLE Weight Bearing Per Order WBAT   Other Precautions Multiple lines; Fall Risk   General   Family/Caregiver Present Yes   Cognition   Overall Cognitive Status WFL   Arousal/Participation Alert   Orientation Level Oriented X4   Following Commands Follows all commands and directions without difficulty   RUE Assessment   RUE Assessment WFL   RUE Strength   RUE Overall Strength Within Functional Limits - strength 5/5   LUE Assessment   LUE Assessment WFL   LUE Strength   LUE Overall Strength Within Functional Limits - strength 5/5   RLE Assessment   RLE Assessment WFL   Strength RLE   RLE Overall Strength 4-/5   LLE Assessment   LLE Assessment WFL   Strength LLE   LLE Overall Strength 5/5   Coordination   Movements are Fluid and Coordinated 1   Sensation X  (RLE slightly numb)   Bed Mobility   Supine to Sit 5  Supervision   Additional items Increased time required;Verbal cues   Transfers   Sit to Stand 4  Minimal assistance   Additional items Assist x 1; Increased time required;Verbal cues   Stand to Sit 4  Minimal assistance   Additional items Assist x 1; Armrests; Increased time required;Verbal cues   Additional Comments cues for techniques   Ambulation/Elevation   Gait pattern Decreased foot clearance;Decreased R stance; Short stride; Step to;Excessively slow   Gait Assistance 4  Minimal assist   Additional items Assist x 1;Verbal cues; Tactile cues   Assistive Device Rolling walker   Distance 3'x1   Balance   Static Sitting Good   Static Standing Fair -   Ambulatory Poor +   Activity Tolerance   Activity Tolerance Patient tolerated treatment well   Nurse Made Aware [place]   Assessment   Prognosis Good   Problem List Decreased strength;Decreased range of motion;Decreased endurance; Impaired balance;Decreased mobility; Impaired sensation   Assessment Pt  37 y o male s/p R TKR 2* to severe DJD  Pt referred to PT for mobility assessment & D/C planning  PTA, pt reports being I w/o AD  On eval, pt demonstrate dec mobility, balance, endurance & amb 2* to RLE weakness  Pt require minAx1 for most functional mobility + cues for techniques  Gait deviations as above, slow w/ difficulty advancing RLE but no gross LOB noted  No dizziness reported t/o session  Will continue skilled PT to improve function & safety  Pt plans to return home at D/C  At this time, will anticipate good progress in PT for safe D/C to home  Will recommend HHPT, RW & family support at D/C  Pt will need to achieve PT goals prior to D/C for safe return to home  CM to follow  Pt tolerated OOB in chair at end of session w/o issues  Call bell in reach  Nsg staff to continue to mobilized pt (OOB in chair for all meals & ambulate in room/unit) as tolerated to prevent further decline in function  Nsg notified      Barriers to Discharge None   Goals   Patient Goals go home   STG Expiration Date 03/09/18   Short Term Goal #1 Goals to be met in 7 days; pt will be able to: 1) inc strength & balance by 1/2 grade; 2) inc functional mobility to modified I; 3) inc amb w/ RW approx  >150' w/ modified I; 4) inc barthel score to 55; 5) negotiate stairs w/ S; 6) pt/caregiver ed   Treatment Day 0   Plan   Treatment/Interventions Functional transfer training;LE strengthening/ROM; Elevations; Therapeutic exercise; Endurance training;Patient/family training;Bed mobility;Gait training;Spoke to nursing;Family   PT Frequency Twice a day;7x/wk; Weekend   Recommendation   Recommendation Home PT; Home with family support; Outpatient PT  (depending on progress)   Equipment Recommended Walker  (RW)   PT - OK to Discharge No  (pt to achieve PT goals prior to D/C home)   Barthel Index   Feeding 10   Bathing 0   Grooming Score 5   Dressing Score 5   Bladder Score 0   Bowels Score 10   Toilet Use Score 5   Transfers (Bed/Chair) Score 10   Mobility (Level Surface) Score 0   Stairs Score 0   Barthel Index Score 45   Hx/personal factors: co-morbidities; fall risk; currently functioning below baseline; inaccessible home; dec caregiver; lines; use of AD; pain  Examination: dec mobility, dec balance, dec endurance, dec amb, high risk for falls; pain; RLE weakness  Clinical: unpredictable (POD#0, pain mgt; fall risk)  Complexity: high    Rina Dowling, PT

## 2018-03-02 NOTE — PLAN OF CARE
Problem: SKIN/TISSUE INTEGRITY - ADULT  Goal: Incision(s), wounds(s) or drain site(s) healing without S/S of infection  INTERVENTIONS  - Assess and document risk factors for skin impairment   - Assess and document dressing, incision, wound bed, drain sites and surrounding tissue  - Initiate Nutrition services consult and/or wound management as needed  Outcome: Progressing      Problem: MUSCULOSKELETAL - ADULT  Goal: Maintain or return mobility to safest level of function  INTERVENTIONS:  - Assess patient's ability to carry out ADLs; assess patient's baseline for ADL function and identify physical deficits which impact ability to perform ADLs (bathing, care of mouth/teeth, toileting, grooming, dressing, etc )  - Assess/evaluate cause of self-care deficits   - Assess range of motion  - Assess patient's mobility; develop plan if impaired  - Assess patient's need for assistive devices and provide as appropriate  - Encourage maximum independence but intervene and supervise when necessary  - Involve family in performance of ADLs  - Assess for home care needs following discharge   - Request OT consult to assist with ADL evaluation and planning for discharge  - Provide patient education as appropriate  Outcome: Progressing    Goal: Maintain proper alignment of affected body part  INTERVENTIONS:  - Support, maintain and protect limb and body alignment  - Provide pt/fam with appropriate education  Outcome: Progressing      Problem: PAIN - ADULT  Goal: Verbalizes/displays adequate comfort level or baseline comfort level  Interventions:  - Encourage patient to monitor pain and request assistance  - Assess pain using appropriate pain scale  - Administer analgesics based on type and severity of pain and evaluate response  - Implement non-pharmacological measures as appropriate and evaluate response  - Consider cultural and social influences on pain and pain management  - Notify physician/advanced practitioner if interventions unsuccessful or patient reports new pain  Outcome: Progressing      Problem: SAFETY ADULT  Goal: Patient will remain free of falls  INTERVENTIONS:  - Assess patient frequently for physical needs  -  Identify cognitive and physical deficits and behaviors that affect risk of falls    -  [place] fall precautions as indicated by assessment   - Educate patient/family on patient safety including physical limitations  - Instruct patient to call for assistance with activity based on assessment  - Modify environment to reduce risk of injury  - Consider OT/PT consult to assist with strengthening/mobility  Outcome: Progressing    Goal: Maintain or return to baseline ADL function  INTERVENTIONS:  -  Assess patient's ability to carry out ADLs; assess patient's baseline for ADL function and identify physical deficits which impact ability to perform ADLs (bathing, care of mouth/teeth, toileting, grooming, dressing, etc )  - Assess/evaluate cause of self-care deficits   - Assess range of motion  - Assess patient's mobility; develop plan if impaired  - Assess patient's need for assistive devices and provide as appropriate  - Encourage maximum independence but intervene and supervise when necessary  ¯ Involve family in performance of ADLs  ¯ Assess for home care needs following discharge   ¯ Request OT consult to assist with ADL evaluation and planning for discharge  ¯ Provide patient education as appropriate  Outcome: Progressing    Goal: Maintain or return mobility status to optimal level  INTERVENTIONS:  - Assess patient's baseline mobility status (ambulation, transfers, stairs, etc )    - Identify cognitive and physical deficits and behaviors that affect mobility  - Identify mobility aids required to assist with transfers and/or ambulation (gait belt, sit-to-stand, lift, walker, cane, etc )  - [place] fall precautions as indicated by assessment  - Record patient progress and toleration of activity level on Mobility SBAR; progress patient to next Phase/Stage  - Instruct patient to call for assistance with activity based on assessment  - Request Rehabilitation consult to assist with strengthening/weightbearing, etc   Outcome: Progressing

## 2018-03-03 VITALS
DIASTOLIC BLOOD PRESSURE: 56 MMHG | TEMPERATURE: 97 F | BODY MASS INDEX: 33.64 KG/M2 | WEIGHT: 235 LBS | HEART RATE: 73 BPM | HEIGHT: 70 IN | OXYGEN SATURATION: 98 % | SYSTOLIC BLOOD PRESSURE: 121 MMHG | RESPIRATION RATE: 18 BRPM

## 2018-03-03 LAB
ANION GAP SERPL CALCULATED.3IONS-SCNC: 6 MMOL/L (ref 4–13)
BUN SERPL-MCNC: 12 MG/DL (ref 5–25)
CALCIUM SERPL-MCNC: 9 MG/DL (ref 8.3–10.1)
CHLORIDE SERPL-SCNC: 104 MMOL/L (ref 100–108)
CO2 SERPL-SCNC: 28 MMOL/L (ref 21–32)
CREAT SERPL-MCNC: 0.75 MG/DL (ref 0.6–1.3)
GFR SERPL CREATININE-BSD FRML MDRD: 116 ML/MIN/1.73SQ M
GLUCOSE SERPL-MCNC: 133 MG/DL (ref 65–140)
HCT VFR BLD AUTO: 33.5 % (ref 36.5–49.3)
HGB BLD-MCNC: 11.4 G/DL (ref 12–17)
POTASSIUM SERPL-SCNC: 3.9 MMOL/L (ref 3.5–5.3)
SODIUM SERPL-SCNC: 138 MMOL/L (ref 136–145)

## 2018-03-03 PROCEDURE — 85018 HEMOGLOBIN: CPT | Performed by: PHYSICIAN ASSISTANT

## 2018-03-03 PROCEDURE — 85014 HEMATOCRIT: CPT | Performed by: PHYSICIAN ASSISTANT

## 2018-03-03 PROCEDURE — 97116 GAIT TRAINING THERAPY: CPT

## 2018-03-03 PROCEDURE — 97110 THERAPEUTIC EXERCISES: CPT

## 2018-03-03 PROCEDURE — 80048 BASIC METABOLIC PNL TOTAL CA: CPT | Performed by: PHYSICIAN ASSISTANT

## 2018-03-03 RX ORDER — OXYCODONE HYDROCHLORIDE 5 MG/1
5 TABLET ORAL EVERY 4 HOURS PRN
Qty: 60 TABLET | Refills: 0 | Status: SHIPPED | OUTPATIENT
Start: 2018-03-03 | End: 2018-03-13

## 2018-03-03 RX ORDER — ASPIRIN 81 MG/1
81 TABLET, CHEWABLE ORAL 2 TIMES DAILY
Qty: 90 TABLET | Refills: 0 | Status: SHIPPED | OUTPATIENT
Start: 2018-03-03

## 2018-03-03 RX ORDER — CELECOXIB 200 MG/1
200 CAPSULE ORAL DAILY
Qty: 30 CAPSULE | Refills: 1 | Status: SHIPPED | OUTPATIENT
Start: 2018-03-03

## 2018-03-03 RX ORDER — METHOCARBAMOL 500 MG/1
500 TABLET, FILM COATED ORAL EVERY 6 HOURS PRN
Qty: 40 TABLET | Refills: 0 | Status: SHIPPED | OUTPATIENT
Start: 2018-03-03

## 2018-03-03 RX ADMIN — KETOROLAC TROMETHAMINE 30 MG: 30 INJECTION, SOLUTION INTRAMUSCULAR at 11:10

## 2018-03-03 RX ADMIN — KETOROLAC TROMETHAMINE 30 MG: 30 INJECTION, SOLUTION INTRAMUSCULAR at 04:34

## 2018-03-03 RX ADMIN — PANTOPRAZOLE SODIUM 40 MG: 40 TABLET, DELAYED RELEASE ORAL at 06:20

## 2018-03-03 RX ADMIN — SENNOSIDES 8.6 MG: 8.6 TABLET, FILM COATED ORAL at 11:11

## 2018-03-03 RX ADMIN — Medication 1 TABLET: at 08:10

## 2018-03-03 RX ADMIN — METHOCARBAMOL 500 MG: 500 TABLET ORAL at 08:10

## 2018-03-03 RX ADMIN — OXYCODONE HYDROCHLORIDE AND ACETAMINOPHEN 1 TABLET: 5; 325 TABLET ORAL at 08:11

## 2018-03-03 RX ADMIN — CELECOXIB 200 MG: 200 CAPSULE ORAL at 11:11

## 2018-03-03 RX ADMIN — ASPIRIN 81 MG 81 MG: 81 TABLET ORAL at 08:11

## 2018-03-03 RX ADMIN — SODIUM CHLORIDE, SODIUM LACTATE, POTASSIUM CHLORIDE, AND CALCIUM CHLORIDE 125 ML/HR: .6; .31; .03; .02 INJECTION, SOLUTION INTRAVENOUS at 08:39

## 2018-03-03 RX ADMIN — OXYCODONE HYDROCHLORIDE 10 MG: 5 TABLET ORAL at 13:26

## 2018-03-03 RX ADMIN — CEFAZOLIN SODIUM 1000 MG: 1 SOLUTION INTRAVENOUS at 04:30

## 2018-03-03 RX ADMIN — SODIUM CHLORIDE, SODIUM LACTATE, POTASSIUM CHLORIDE, AND CALCIUM CHLORIDE 125 ML/HR: .6; .31; .03; .02 INJECTION, SOLUTION INTRAVENOUS at 00:17

## 2018-03-03 NOTE — CASE MANAGEMENT
Initial Clinical Review    Age/Sex: 42 y o  male    Surgery Date: 3/2/18    Procedure: Right Knee Replacement    Anesthesia: Spinal     Admission Orders: Date/Time/Statement: 3/2/18 @ 4393     Orders Placed This Encounter   Procedures    Inpatient Admission     Standing Status:   Standing     Number of Occurrences:   1     Order Specific Question:   Admitting Physician     Answer:   Codey Guerra     Order Specific Question:   Level of Care     Answer:   Med Surg [16]     Order Specific Question:   Estimated length of stay     Answer:   Inpatient Only Surgery       Vital Signs: /76 (BP Location: Left arm)   Pulse 70   Temp (!) 96 9 °F (36 1 °C) (Tympanic)   Resp 18   Ht 5' 10" (1 778 m)   Wt 107 kg (235 lb)   SpO2 100%   BMI 33 72 kg/m²     Diet:        Diet Orders            Start     Ordered    03/02/18 1632  Diet Regular; Regular House  Diet effective now     Question Answer Comment   Diet Type Regular    Regular Regular House    RD to adjust diet per protocol?  Yes        03/02/18 1631          Mobility: WBAT ACTIVITY BEGINNING POD 1     DVT Prophylaxis: ASA BID    Pain Control:   Pain Medications             traMADol (ULTRAM) 50 mg tablet Take 50 mg by mouth every 6 (six) hours as needed for moderate pain    aspirin 81 mg chewable tablet Chew 1 tablet (81 mg total) 2 (two) times a day    celecoxib (CeleBREX) 200 mg capsule Take 1 capsule (200 mg total) by mouth daily    ibuprofen (MOTRIN) 200 mg tablet Take by mouth every 6 (six) hours as needed for mild pain    methocarbamol (ROBAXIN) 500 mg tablet Take 1 tablet (500 mg total) by mouth every 6 (six) hours as needed for muscle spasms    oxyCODONE (ROXICODONE) 5 mg immediate release tablet Take 1 tablet (5 mg total) by mouth every 4 (four) hours as needed for moderate pain for up to 10 days Max Daily Amount: 30 mg        Scheduled Meds:  Current Facility-Administered Medications:  acetaminophen 650 mg Oral Q6H PRN Ez Walsh PA-C aluminum-magnesium hydroxide-simethicone 20 mL Oral Q6H PRN Cuco Given, PA-C    aspirin 81 mg Oral BID Cuco Given, PA-C    calcium carbonate 1,000 mg Oral Daily PRN Cuco Given, PA-C    celecoxib 200 mg Oral Daily Brandon Kay, MIRANDA    HYDROmorphone 0 2 mg Intravenous Q3H PRN Cuco Given, PA-KAMINI    HYDROmorphone 0 5 mg Intravenous Q2H PRN Nilton Casiano MD    ketorolac 30 mg Intravenous Q6H PRN Cuco Given, PA-C    ketorolac 30 mg Intravenous Q6H PRN Nilton Casiano MD    lactated ringers 125 mL/hr Intravenous Continuous Cuco Given, PA-C Last Rate: 125 mL/hr (03/03/18 0839)   methocarbamol 500 mg Oral Q6H PRN Cuco Given, PA-C    multivitamin-minerals 1 tablet Oral Daily Cuco Given, PA-KAMINI    nalbuphine 5 mg Intravenous Q3H PRN Nilton Casiano MD    naloxone 0 1 mg Intravenous Q3 min PRN Nilton Casiano MD    ondansetron 4 mg Intravenous Q4H PRN Nilton Casiano MD    oxyCODONE 10 mg Oral Q4H PRN Cuco Given, PA-KAMINI    oxyCODONE 5 mg Oral Q4H PRN Cuco Given, PA-KAMINI    oxyCODONE-acetaminophen 1 tablet Oral Q4H PRN Nilton Casiano MD    pantoprazole 40 mg Oral Daily Cuco Given, PA-KAMINI    senna 1 tablet Oral Daily Brandon Kay, MIRANDA    sodium chloride 125 mL/hr Intravenous Continuous Sonal Covarrubias DO Last Rate: Stopped (03/02/18 1555)   traMADol 50 mg Oral Q6H PRN Cuco Given, PA-C      Continuous Infusions:  lactated ringers 125 mL/hr Last Rate: 125 mL/hr (03/03/18 0839)   sodium chloride 125 mL/hr Last Rate: Stopped (03/02/18 1555)     PRN Meds:   acetaminophen    aluminum-magnesium hydroxide-simethicone    calcium carbonate    HYDROmorphone    HYDROmorphone    ketorolac    ketorolac    methocarbamol    nalbuphine    naloxone    ondansetron    oxyCODONE    oxyCODONE    oxyCODONE-acetaminophen    traMADol      Thank you,  [facility] in the [facility], [facility] by Ky Amador for 2017  Network Utilization Review Department  Phone: [phone];  Fax [phone]  ATTENTION: The Network Utilization Review Department is now centralized for our 7 Facilities  Make a note that we have a new phone and fax numbers for our Department  Please call with any questions or concerns to [phone] and carefully follow the prompts so that you are directed to the right person  All voicemails are confidential  Fax any determinations, approvals, denials, and requests for initial or continue stay review clinical to [phone]  Due to HIGH CALL volume, it would be easier if you could please send faxed requests to expedite your requests and in part, help us provide discharge notifications faster

## 2018-03-03 NOTE — PROGRESS NOTES
Progress Note - Internal Medicine   Brandy Juana Diaz 42 y o  male MRN: [id number]  Unit/Bed#: E2 -01 Encounter: [id number]      Impression :    POD #1,  elective right knee replacement by Dr Aruna Dunaway  Advanced degenerative joint disease right knee with previous multiple joint surgeries  Ambulatory dysfunction  Suspected obstructive sleep apnea  Recommendation:    · Patient is hemodynamically stable as evident on the flow sheets  Patients  pain is well controlled with current analgesics  · Medically stable for discharge with out patient follow ups as arranged by  once cleared by Therapy and Orthopedic team   Patient has been also advised to increase intake of his free fluid and also takes some soups with salt as his blood pressure was relatively on the lower side but fortunately had no symptoms of orthostasis  · Continue DVT prophylaxis as per surgical team with ASA 81 mg PO BID  · Monitor for any redness/ drainage / swelling around site of surgery and to notify Orthopedic team or PCP  · Recommend life style modifications and any high impact activities  · Continue PT /OT to improve endurance, range of motion, gait stabilization and use of assist device in a safe manner  · Recheck Hemoglobin and hematocrit  through PCP in 1 week upon discharge  · Full fall and orthostatic precautions  Subjective:     Patient seen and examined today  EMR and overnight events reviewed  Patient is sitting on the site chair with the foot stool on which he had his operated leg  His family members were visiting him including his wife and 2 kids  He seemed to be comfortable  Not in any distress  He indicates of having minimal pain symptoms currently  Last night he did have some soreness around the surgical site on his right knee which she reported to be about 2/10 on numeric pain scale  It improved with analgesics    Patient is hoping to be released later today after a accomplish further physical therapy sessions  He has not noticed any shortness of breath, denies any swelling in his calf, denies any pleuritic chest pain, denies any bleeding from his surgical site, appetite is back to normal   He has no urinary complaints denies any dizziness or lightheadedness  Denies any confusion sore throat headache or any focal weakness  All other review of systems are negative  OBJECTIVE:     Vitals:     HR:  [67-84] 73  Resp:  [14-20] 18  BP: (100-127)/(56-76) 121/56  SpO2:  [95 %-100 %] 98 %  Temp (24hrs), Av 2 °F (36 2 °C), Min:96 5 °F (35 8 °C), Max:97 9 °F (36 6 °C)  Current: Temperature: (!) 97 °F (36 1 °C)    Intake/Output Summary (Last 24 hours) at 18 1147  Last data filed at 18 1100   Gross per 24 hour   Intake           4472 5 ml   Output             2825 ml   Net           1647 5 ml     Body mass index is 33 72 kg/m²  Physical Exam      Awake and alert not in any distress  Pallor JVP cyanosis negative  Hydration is fair, skin turgor is normal   Lungs are clear no rales or crackles no crepitations  S1-S2 normal no murmurs or gallop  Abdomen is soft nontender,  Back no kyphoscoliosis or tenderness  Lower extremity bilateral calf nontender  Patient wearing Juan Manuel stocking on his left leg but not on the right side  Right knee clean surgical dressing  No discharge no drainage  Around the bottom portion of his dressing approximately 1 cm above the lower end, there is a tiny spot of serous drainage which is approximately 1 cm in diameter  There is no surrounding erythema, there is no wound dehiscence no bleeding  Neurologically patient is fully awake alert oriented, memory is intact, good hand grasp and plantar flexion, normal dorsiflexion  Knee flexion and extension slightly limited on the operated right side      Labs, Imaging, & Other studies:    All pertinent labs and imaging studies were personally reviewed    Results from last 7 days  Lab Units 18  0539   HEMOGLOBIN g/dL 11 4*       Results from last 7 days  Lab Units 03/03/18  0539   SODIUM mmol/L 138   POTASSIUM mmol/L 3 9   CHLORIDE mmol/L 104   CO2 mmol/L 28   ANION GAP mmol/L 6   BUN mg/dL 12   CREATININE mg/dL 0 75   EGFR ml/min/1 73sq m 116   GLUCOSE RANDOM mg/dL 133   CALCIUM mg/dL 9 0       VTE Pharmacologic Prophylaxis:  as per Primary Ortho Team   VTE Mechanical Prophylaxis: sequential compression device    Portions of the record may have been created with voice recognition software  Occasional wrong word or "sound a like" substitutions may have occurred due to the inherent limitations of voice recognition software  Read the chart carefully and recognize, using context, where substitutions have occurred

## 2018-03-03 NOTE — SOCIAL WORK
d/c planning consult received  CM met with patient and family to discuss needs; CM introduced self and role  Patient resides with family and was independent PTA  Patient will need RW; no DME provider preference reported  Referral sent to [facility]  RW delivered from [facility]  patient and spouse aware of potential copay for DME and are agreeable  Patient intends to do outpt PT, will have family support to transport  Patient given outpt PT provider list  No further needs at present  CM to follow as needed       TOMMY Tello  03/03/18  2:24 PM

## 2018-03-03 NOTE — PHYSICAL THERAPY NOTE
Physical Therapy Treatment Note     03/03/18 5045   Pain Assessment   Pain Assessment 0-10   Pain Score 3   Pain Type Surgical pain   Pain Location Knee   Pain Orientation Right   Hospital Pain Intervention(s) Ambulation/increased activity; Emotional support;Cold applied   Response to Interventions tolerated well   Restrictions/Precautions   RLE Weight Bearing Per Order WBAT   Other Precautions Pain; Fall Risk   General   Chart Reviewed Yes   Response to Previous Treatment Patient with no complaints from previous session  Family/Caregiver Present No   Cognition   Overall Cognitive Status WFL   Arousal/Participation Alert   Attention Within functional limits   Orientation Level Oriented X4   Memory Within functional limits   Following Commands Follows all commands and directions without difficulty   Subjective   Subjective PT Offer no complaints  Pt agreeable to PT  Bed Mobility   Supine to Sit 7  Independent   Sit to Supine 7  Independent   Transfers   Sit to Stand 6  Modified independent   Stand to Sit 6  Modified independent   Ambulation/Elevation   Gait pattern (reciprocal gait pattern)   Gait Assistance 6  Modified independent   Additional items Assist x 1   Assistive Device Rolling walker   Distance 360'   Stair Management Assistance 5  Supervision   Additional items Assist x 1;Verbal cues   Stair Management Technique Foreward; With walker; Two rails   Number of Stairs ( 5 steps with )   Curbs curb step with use of Rw and supervision, using forward approach  Balance   Static Sitting Good   Static Standing Good   Ambulatory Good   Activity Tolerance   Activity Tolerance Patient tolerated treatment well   Nurse Made Aware Ramirze silveira   Exercises   TKR Supine;10 reps;AROM; Right   Equipment Use   Comments Pt instructed TKR supine HEP, stair climbing and curb step climbing with use of Rw     Assessment   Prognosis Good   Problem List Decreased range of motion;Decreased strength;Decreased endurance; Impaired balance;Pain   Assessment PT showing good progress with all aspects of mobility  Pt progressed to I with bed mobility, mod I with transfers and ambulation  Pt progressed with ambulation distances to 360' with use of Rw  Pt progressed to stair climbing with use of RW for curb step climbing and b /l rails for stair climbing with supervision assist and verbal cues for technique  PT performed supine TKR HEP without difficulty  PT is able to I'ly SLR and performs all exercises actively  PT has achieved inpt PT goals and is appropriate for d/c home with family support and assistance and HHPT  Goals   Patient Goals to go home  STG Expiration Date 03/09/18   Treatment Day 1   Plan   Treatment/Interventions Functional transfer training;LE strengthening/ROM; Elevations; Therapeutic exercise; Endurance training;Patient/family training;Equipment eval/education; Bed mobility;Gait training;Spoke to nursing   Progress Improving as expected   PT Frequency 7x/wk; Twice a day   Recommendation   Recommendation Home with family support; Outpatient PT   PT - OK to Discharge Yes         Calin Solid, PTA

## 2018-03-03 NOTE — PROGRESS NOTES
Orthopedic Total Knee Progress Note    SUBJECTIVE    Status post Right total knee arthroplasty  Systemic or Specific Complaints: No Complaints    OBJECTIVE    Vital signs in last 24 hours:  Temp:  [96 5 °F (35 8 °C)-98 4 °F (36 9 °C)] 96 9 °F (36 1 °C)  HR:  [67-84] 70  Resp:  [14-20] 18  BP: (100-146)/(57-85) 108/76    Neurovascular: Capillary refill: Normal  Dorsiflexion: 5/5  Plantarflexion:  5/5   Wound: Dressing:  clean/dry/intact   DVT Exam: No evidence of DVT seen on physical exam      Data Review:  CBC:   Lab Results   Component Value Date    WBC 5 39 02/06/2018    RBC 5 09 02/06/2018    HGB 11 4 (L) 03/03/2018    HCT 33 5 (L) 03/03/2018     02/06/2018       ASSESSMENT/PLAN    Status post- Right total knee arthroplasty: Doing well postoperatively  Discharge today, Return to Clinic: as scheduled if cleared by therapy and Medicine      Bar Lopez PA-C    Date: 3/3/2018  Time: 8:17 AM

## 2018-03-03 NOTE — PLAN OF CARE

## 2018-03-03 NOTE — DISCHARGE SUMMARY
DISCHARGE SUMMARY      Patient Name: Cipriano Mata  Patient MRN: [id number]  Admitting Provider: Hermelinda Dawn MD  Discharging Provider: Hermelinda Dawn MD  Primary Care Physician at Discharge: Hayley Preston [phone]  Admission Date: 3/2/2018       Discharge Date: 3/3/18    Admission Diagnosis  Primary osteoarthritis of right knee [M17 11]    Discharge Diagnoses  [facility] Course  Cipriano Mata was admitted to [facility] on 3/2/2018, with diagnosis of osteoarthritis in his Right knee  On the day of admission, he was brought to surgery, successfully underwent a Right knee replacement  He tolerated the procedure well  Following surgery, he was taken to the recovery room, at which time, there was a consult placed for Dr Isidra Barrett for the patient's medical management  After a short stay in the recovery room, he was transferred to the orthopedic floor at which time, he was resumed on his routine home medications per the hospitalist group  On postop day #1, he was able to start some physical therapy and was able to tolerate it well  At this time, he was in stable condition, showing no sign of deep vein thrombosis or pulmonary embolism  Incision was healing well at the time and showed no signs of infection  DISCHARGE DIAGNOSIS: Primary osteoarthritis of right knee [M17 11]  He is now status post Right total knee replacement, which he underwent during the hospital stay  Medications  See after visit summary for reconciled discharge medications provided to patient and family  Allergies  No Known Allergies    Outpatient Follow-Up  No future appointments      Discharge Disposition  home    Operative Procedures Performed  Procedure(s):  ARTHROPLASTY KNEE TOTAL,        Belia Dunbar PA-C     DISCHARGE SUMMARY

## 2018-03-03 NOTE — PLAN OF CARE
Problem: PHYSICAL THERAPY ADULT  Goal: Performs mobility at highest level of function for planned discharge setting  See evaluation for individualized goals  Treatment/Interventions: Functional transfer training, LE strengthening/ROM, Elevations, Therapeutic exercise, Endurance training, Patient/family training, Bed mobility, Gait training, Spoke to nursing, Family  Equipment Recommended: Thomas Reed (STEPHEN)       See flowsheet documentation for full assessment, interventions and recommendations  Outcome: Adequate for Discharge  Prognosis: Good  Problem List: Decreased range of motion, Decreased strength, Decreased endurance, Impaired balance, Pain  Assessment: PT showing good progress with all aspects of mobility  Pt progressed to I with bed mobility, mod I with transfers and ambulation  Pt progressed with ambulation distances to 360' with use of Rw  Pt progressed to stair climbing with use of RW for curb step climbing and b /l rails for stair climbing with supervision assist and verbal cues for technique  PT performed supine TKR HEP without difficulty  PT is able to I'ly SLR and performs all exercises actively  PT has achieved inpt PT goals and is appropriate for d/c home with family support and assistance and HHPT  Barriers to Discharge: None     Recommendation: Home with family support, Outpatient PT     PT - OK to Discharge: Yes    See flowsheet documentation for full assessment

## 2019-08-06 ENCOUNTER — TELEPHONE (OUTPATIENT)
Dept: FAMILY MEDICINE CLINIC | Facility: CLINIC | Age: 39
End: 2019-08-06

## 2019-08-06 NOTE — TELEPHONE ENCOUNTER
Called patient regarding following Dr Bladimir Fernandez or staying with present [address] [facility] office, there was no answer, will try again later today

## 2019-08-06 NOTE — TELEPHONE ENCOUNTER
Called patient a second time regarding following Dr Holland Norris with no answer again, will try again tomorrow

## 2019-11-26 NOTE — ANESTHESIA POSTPROCEDURE EVALUATION
Post-Op Assessment Note      CV Status:  Stable    Mental Status:  Alert and awake    Hydration Status:  Euvolemic    PONV Controlled:  Controlled    Airway Patency:  Patent    Post Op Vitals Reviewed: Yes          Staff: Anesthesiologist           /71 (03/02/18 1540)    Temp     Pulse 76 (03/02/18 1540)   Resp 18 (03/02/18 1540)    SpO2 95 % (03/02/18 1540)
no

## 2021-03-26 DIAGNOSIS — Z23 ENCOUNTER FOR IMMUNIZATION: ICD-10-CM

## 2023-05-09 ENCOUNTER — APPOINTMENT (EMERGENCY)
Dept: CT IMAGING | Facility: HOSPITAL | Age: 43
End: 2023-05-09

## 2023-05-09 ENCOUNTER — HOSPITAL ENCOUNTER (OUTPATIENT)
Facility: HOSPITAL | Age: 43
Setting detail: OUTPATIENT SURGERY
LOS: 1 days | Discharge: HOME | End: 2023-05-10
Attending: EMERGENCY MEDICINE | Admitting: SURGERY

## 2023-05-09 ENCOUNTER — OFFICE VISIT (OUTPATIENT)
Dept: URGENT CARE | Facility: CLINIC | Age: 43
End: 2023-05-09

## 2023-05-09 VITALS
DIASTOLIC BLOOD PRESSURE: 75 MMHG | OXYGEN SATURATION: 18 % | RESPIRATION RATE: 16 BRPM | TEMPERATURE: 97 F | HEART RATE: 77 BPM | SYSTOLIC BLOOD PRESSURE: 131 MMHG

## 2023-05-09 DIAGNOSIS — R10.11 RIGHT UPPER QUADRANT ABDOMINAL PAIN: Primary | ICD-10-CM

## 2023-05-09 DIAGNOSIS — K80.20 CALCULUS OF GALLBLADDER: ICD-10-CM

## 2023-05-09 DIAGNOSIS — K81.2 ACUTE ON CHRONIC CHOLECYSTITIS: ICD-10-CM

## 2023-05-09 LAB
ALBUMIN SERPL BCP-MCNC: 5 G/DL (ref 3.5–5)
ALP SERPL-CCNC: 66 U/L (ref 34–104)
ALT SERPL W P-5'-P-CCNC: 29 U/L (ref 7–52)
ANION GAP SERPL CALCULATED.3IONS-SCNC: 8 MMOL/L (ref 4–13)
AST SERPL W P-5'-P-CCNC: 26 U/L (ref 13–39)
BASOPHILS # BLD AUTO: 0.04 THOUSANDS/ÂΜL (ref 0–0.1)
BASOPHILS NFR BLD AUTO: 0 % (ref 0–1)
BILIRUB SERPL-MCNC: 0.54 MG/DL (ref 0.2–1)
BUN SERPL-MCNC: 15 MG/DL (ref 5–25)
CALCIUM SERPL-MCNC: 10.3 MG/DL (ref 8.4–10.2)
CHLORIDE SERPL-SCNC: 98 MMOL/L (ref 96–108)
CO2 SERPL-SCNC: 29 MMOL/L (ref 21–32)
CREAT SERPL-MCNC: 0.85 MG/DL (ref 0.6–1.3)
EOSINOPHIL # BLD AUTO: 0.05 THOUSAND/ÂΜL (ref 0–0.61)
EOSINOPHIL NFR BLD AUTO: 1 % (ref 0–6)
ERYTHROCYTE [DISTWIDTH] IN BLOOD BY AUTOMATED COUNT: 13 % (ref 11.6–15.1)
GFR SERPL CREATININE-BSD FRML MDRD: 105 ML/MIN/1.73SQ M
GLUCOSE SERPL-MCNC: 121 MG/DL (ref 65–140)
HCT VFR BLD AUTO: 46.7 % (ref 36.5–49.3)
HGB BLD-MCNC: 15.7 G/DL (ref 12–17)
IMM GRANULOCYTES # BLD AUTO: 0.05 THOUSAND/UL (ref 0–0.2)
IMM GRANULOCYTES NFR BLD AUTO: 1 % (ref 0–2)
LIPASE SERPL-CCNC: 31 U/L (ref 11–82)
LYMPHOCYTES # BLD AUTO: 1.21 THOUSANDS/ÂΜL (ref 0.6–4.47)
LYMPHOCYTES NFR BLD AUTO: 12 % (ref 14–44)
MCH RBC QN AUTO: 27.9 PG (ref 26.8–34.3)
MCHC RBC AUTO-ENTMCNC: 33.6 G/DL (ref 31.4–37.4)
MCV RBC AUTO: 83 FL (ref 82–98)
MONOCYTES # BLD AUTO: 0.44 THOUSAND/ÂΜL (ref 0.17–1.22)
MONOCYTES NFR BLD AUTO: 4 % (ref 4–12)
NEUTROPHILS # BLD AUTO: 8.2 THOUSANDS/ÂΜL (ref 1.85–7.62)
NEUTS SEG NFR BLD AUTO: 82 % (ref 43–75)
NRBC BLD AUTO-RTO: 0 /100 WBCS
PLATELET # BLD AUTO: 200 THOUSANDS/UL (ref 149–390)
PMV BLD AUTO: 12 FL (ref 8.9–12.7)
POTASSIUM SERPL-SCNC: 4.2 MMOL/L (ref 3.5–5.3)
PROT SERPL-MCNC: 7.9 G/DL (ref 6.4–8.4)
RBC # BLD AUTO: 5.63 MILLION/UL (ref 3.88–5.62)
SODIUM SERPL-SCNC: 135 MMOL/L (ref 135–147)
WBC # BLD AUTO: 9.99 THOUSAND/UL (ref 4.31–10.16)

## 2023-05-09 RX ORDER — METRONIDAZOLE 500 MG/100ML
500 INJECTION, SOLUTION INTRAVENOUS EVERY 8 HOURS
Status: DISCONTINUED | OUTPATIENT
Start: 2023-05-09 | End: 2023-05-10 | Stop reason: HOSPADM

## 2023-05-09 RX ORDER — PANTOPRAZOLE SODIUM 40 MG/10ML
40 INJECTION, POWDER, LYOPHILIZED, FOR SOLUTION INTRAVENOUS
Status: DISCONTINUED | OUTPATIENT
Start: 2023-05-10 | End: 2023-05-10 | Stop reason: HOSPADM

## 2023-05-09 RX ORDER — CYCLOBENZAPRINE HCL 10 MG
1 TABLET ORAL 3 TIMES DAILY PRN
COMMUNITY
Start: 2014-11-19 | End: 2023-05-09

## 2023-05-09 RX ORDER — KETOROLAC TROMETHAMINE 30 MG/ML
15 INJECTION, SOLUTION INTRAMUSCULAR; INTRAVENOUS ONCE
Status: COMPLETED | OUTPATIENT
Start: 2023-05-09 | End: 2023-05-09

## 2023-05-09 RX ORDER — HYDROCODONE BITARTRATE AND ACETAMINOPHEN 5; 300 MG/1; MG/1
1 TABLET ORAL
COMMUNITY
Start: 2014-11-19 | End: 2023-05-09

## 2023-05-09 RX ORDER — MORPHINE SULFATE 4 MG/ML
4 INJECTION, SOLUTION INTRAMUSCULAR; INTRAVENOUS EVERY 4 HOURS PRN
Status: DISCONTINUED | OUTPATIENT
Start: 2023-05-09 | End: 2023-05-10

## 2023-05-09 RX ORDER — SODIUM CHLORIDE, SODIUM GLUCONATE, SODIUM ACETATE, POTASSIUM CHLORIDE, MAGNESIUM CHLORIDE, SODIUM PHOSPHATE, DIBASIC, AND POTASSIUM PHOSPHATE .53; .5; .37; .037; .03; .012; .00082 G/100ML; G/100ML; G/100ML; G/100ML; G/100ML; G/100ML; G/100ML
125 INJECTION, SOLUTION INTRAVENOUS CONTINUOUS
Status: DISCONTINUED | OUTPATIENT
Start: 2023-05-09 | End: 2023-05-10 | Stop reason: HOSPADM

## 2023-05-09 RX ORDER — CEFAZOLIN SODIUM 2 G/50ML
2000 SOLUTION INTRAVENOUS EVERY 8 HOURS
Status: DISCONTINUED | OUTPATIENT
Start: 2023-05-09 | End: 2023-05-10 | Stop reason: HOSPADM

## 2023-05-09 RX ORDER — ONDANSETRON 2 MG/ML
4 INJECTION INTRAMUSCULAR; INTRAVENOUS EVERY 6 HOURS PRN
Status: DISCONTINUED | OUTPATIENT
Start: 2023-05-09 | End: 2023-05-10 | Stop reason: HOSPADM

## 2023-05-09 RX ORDER — HYDROMORPHONE HCL/PF 1 MG/ML
0.5 SYRINGE (ML) INJECTION EVERY 4 HOURS PRN
Status: DISCONTINUED | OUTPATIENT
Start: 2023-05-09 | End: 2023-05-10

## 2023-05-09 RX ORDER — ENOXAPARIN SODIUM 100 MG/ML
40 INJECTION SUBCUTANEOUS DAILY
Status: DISCONTINUED | OUTPATIENT
Start: 2023-05-10 | End: 2023-05-10 | Stop reason: HOSPADM

## 2023-05-09 RX ADMIN — HYDROMORPHONE HYDROCHLORIDE 0.5 MG: 1 INJECTION, SOLUTION INTRAMUSCULAR; INTRAVENOUS; SUBCUTANEOUS at 23:05

## 2023-05-09 RX ADMIN — KETOROLAC TROMETHAMINE 15 MG: 30 INJECTION, SOLUTION INTRAMUSCULAR; INTRAVENOUS at 16:31

## 2023-05-09 RX ADMIN — MORPHINE SULFATE 4 MG: 4 INJECTION, SOLUTION INTRAMUSCULAR; INTRAVENOUS at 21:46

## 2023-05-09 RX ADMIN — CEFAZOLIN SODIUM 2000 MG: 2 SOLUTION INTRAVENOUS at 22:48

## 2023-05-09 RX ADMIN — SODIUM CHLORIDE 1000 ML: 0.9 INJECTION, SOLUTION INTRAVENOUS at 16:36

## 2023-05-09 RX ADMIN — IOHEXOL 100 ML: 350 INJECTION, SOLUTION INTRAVENOUS at 17:06

## 2023-05-09 NOTE — LETTER
[facility] MED SURG UNIT  [address] [place] [address]  Dept: [phone]    May 10, 2023     Patient: Marshall Carlisle   YOB: 1980   Date of Visit: 5/9/2023       To Whom it May Concern:    Jared Mathews is under my professional care  He was seen in the hospital from 5/9/2023 to 05/10/23  He may return to work on Monday 5/15/2023 with light duty and lifting restriction of 15 lbs  He will be seen for follow-up in 2 weeks  Restrictions will be updated at that time  If you have any questions or concerns, please don't hesitate to call           Sincerely,          Aimee Castaneda PA-C

## 2023-05-09 NOTE — ED PROVIDER NOTES
History  Chief Complaint   Patient presents with   • Abdominal Pain     Patient presents to the ER with report of having RUQ abdominal pain that is worsening since this morning  This is a 38 YOM who presents to the ED for evaluation of RUQ abdominal pain x1 day  Patient reports his pain had an insidious onset shortly after waking up and has been worsening since onset  He reports the pain has been constant, sharp sensation 8/10 on initial evaluation  States the pain is localized to the right upper quadrant and epigastric area, otherwise nonradiating  States he did once earlier today, cauliflower/sausage/broccoli  He reports that the pain has been worsening throughout the day he denies any significant changes shortly after eating  Patient was seen at Urgent Care and sent to the ED for further evaluation  He denies any associated nausea or vomiting, denies any diarrhea or stool changes, reports last BM was yesterday  He denies any recent fevers, chills, headaches, lightheadedness, chest pain, SOB, dysuria  He denies any chronic abdominal problems, denies any previous abdominal surgeries  None       Past Medical History:   Diagnosis Date   • Arthritis     R TKR today 3/2/2018   • Chronic pain disorder    • Contact lens overwear, bilateral    • History of heart murmur in childhood        Past Surgical History:   Procedure Laterality Date   • ANTERIOR CRUCIATE LIGAMENT REPAIR Left    • KNEE ARTHROSCOPY Right    • KNEE SURGERY Right     with hardware   • TOTAL KNEE ARTHROPLASTY Right 3/2/2018    Procedure: ARTHROPLASTY KNEE TOTAL,;  Surgeon: Robert Mims MD;  Location: [facility];  Service: Orthopedics       History reviewed  No pertinent family history  I have reviewed and agree with the history as documented      E-Cigarette/Vaping   • E-Cigarette Use Never User      E-Cigarette/Vaping Substances     Social History     Tobacco Use   • Smoking status: Never   • Smokeless tobacco: Never   Vaping Use • Vaping Use: Never used   Substance Use Topics   • Alcohol use: Yes     Alcohol/week: 24 0 standard drinks     Types: 24 Cans of beer per week     Comment: occasionally   • Drug use: No       Review of Systems   Constitutional: Negative for chills and fever  HENT: Negative for rhinorrhea and sore throat  Eyes: Negative for photophobia and visual disturbance  Respiratory: Negative for cough and shortness of breath  Cardiovascular: Negative for chest pain and palpitations  Genitourinary: Negative for difficulty urinating, dysuria, flank pain and hematuria  Neurological: Negative for dizziness, syncope, light-headedness and headaches  Physical Exam  Physical Exam  Vitals and nursing note reviewed  Constitutional:       General: He is not in acute distress  Appearance: He is well-developed  HENT:      Head: Normocephalic and atraumatic  Eyes:      Conjunctiva/sclera: Conjunctivae normal    Cardiovascular:      Rate and Rhythm: Normal rate and regular rhythm  Heart sounds: No murmur heard  Pulmonary:      Effort: Pulmonary effort is normal  No respiratory distress  Breath sounds: Normal breath sounds  Abdominal:      General: There is no distension  Palpations: Abdomen is soft  Tenderness: There is abdominal tenderness in the right upper quadrant and epigastric area  There is no right CVA tenderness, left CVA tenderness or guarding  Positive signs include Porter's sign  Musculoskeletal:         General: No swelling  Cervical back: Neck supple  Skin:     General: Skin is warm and dry  Capillary Refill: Capillary refill takes less than 2 seconds  Neurological:      General: No focal deficit present  Mental Status: He is alert and oriented to person, place, and time     Psychiatric:         Mood and Affect: Mood normal          Vital Signs  ED Triage Vitals [05/09/23 1524]   Temperature Pulse Respirations Blood Pressure SpO2   (!) 97 4 °F (36 3 °C) 76 18 148/89 100 %      Temp Source Heart Rate Source Patient Position - Orthostatic VS BP Location FiO2 (%)   Temporal Monitor Sitting -- --      Pain Score       10 - Worst Possible Pain           Vitals:    05/09/23 2058 05/09/23 2100 05/09/23 2115 05/09/23 2125   BP:  (!) 160/102  (!) 163/102   Pulse: 89 91 87 91   Patient Position - Orthostatic VS:             Visual Acuity      ED Medications  Medications   multi-electrolyte (PLASMALYTE-A/ISOLYTE-S PH 7 4) IV solution (has no administration in time range)   ondansetron (ZOFRAN) injection 4 mg (has no administration in time range)   enoxaparin (LOVENOX) subcutaneous injection 40 mg (has no administration in time range)   morphine injection 2 mg (has no administration in time range)   morphine injection 4 mg (4 mg Intravenous Given 5/9/23 2146)   HYDROmorphone (DILAUDID) injection 0 5 mg (has no administration in time range)   ceFAZolin (ANCEF) IVPB (premix in dextrose) 2,000 mg 50 mL (has no administration in time range)   metroNIDAZOLE (FLAGYL) IVPB (premix) 500 mg 100 mL (has no administration in time range)   pantoprazole (PROTONIX) injection 40 mg (has no administration in time range)   sodium chloride 0 9 % bolus 1,000 mL (1,000 mL Intravenous New Bag 5/9/23 1636)   ketorolac (TORADOL) injection 15 mg (15 mg Intravenous Given 5/9/23 1631)   iohexol (OMNIPAQUE) 350 MG/ML injection (MULTI-DOSE) 100 mL (100 mL Intravenous Given 5/9/23 1706)       Diagnostic Studies  Results Reviewed     Procedure Component Value Units Date/Time    Comprehensive metabolic panel [[id number]]  (Abnormal) Collected: 05/09/23 1615    Lab Status: Final result Specimen: Blood Updated: 05/09/23 1647     Sodium 135 mmol/L      Potassium 4 2 mmol/L      Chloride 98 mmol/L      CO2 29 mmol/L      ANION GAP 8 mmol/L      BUN 15 mg/dL      Creatinine 0 85 mg/dL      Glucose 121 mg/dL      Calcium 10 3 mg/dL      AST 26 U/L      ALT 29 U/L      Alkaline Phosphatase 66 U/L      Total Protein 7 9 g/dL      Albumin 5 0 g/dL      Total Bilirubin 0 54 mg/dL      eGFR 105 ml/min/1 73sq m     Narrative:      [place] guidelines for Chronic Kidney Disease (CKD):   •  Stage 1 with normal or high GFR (GFR > 90 mL/min/1 73 square meters)  •  Stage 2 Mild CKD (GFR = 60-89 mL/min/1 73 square meters)  •  Stage 3A Moderate CKD (GFR = 45-59 mL/min/1 73 square meters)  •  Stage 3B Moderate CKD (GFR = 30-44 mL/min/1 73 square meters)  •  Stage 4 Severe CKD (GFR = 15-29 mL/min/1 73 square meters)  •  Stage 5 End Stage CKD (GFR <15 mL/min/1 73 square meters)  Note: GFR calculation is accurate only with a steady state creatinine    Lipase [[id number]]  (Normal) Collected: 05/09/23 1615    Lab Status: Final result Specimen: Blood Updated: 05/09/23 1647     Lipase 31 u/L     CBC and differential [[id number]]  (Abnormal) Collected: 05/09/23 1615    Lab Status: Final result Specimen: Blood Updated: 05/09/23 1626     WBC 9 99 Thousand/uL      RBC 5 63 Million/uL      Hemoglobin 15 7 g/dL      Hematocrit 46 7 %      MCV 83 fL      MCH 27 9 pg      MCHC 33 6 g/dL      RDW 13 0 %      MPV 12 0 fL      Platelets 220 Thousands/uL      nRBC 0 /100 WBCs      Neutrophils Relative 82 %      Immat GRANS % 1 %      Lymphocytes Relative 12 %      Monocytes Relative 4 %      Eosinophils Relative 1 %      Basophils Relative 0 %      Neutrophils Absolute 8 20 Thousands/µL      Immature Grans Absolute 0 05 Thousand/uL      Lymphocytes Absolute 1 21 Thousands/µL      Monocytes Absolute 0 44 Thousand/µL      Eosinophils Absolute 0 05 Thousand/µL      Basophils Absolute 0 04 Thousands/µL                  CT abdomen pelvis with contrast   Final Result by Hannah Herrera MD (05/09 1853)      Few prominent proximal fluid-filled small bowel loops in a nonobstructive pattern  Focal enteritis suspected  Solitary gallstone in the gallbladder with no signs of cholecystitis  Stool in the ascending colon  Workstation performed: [id number]                    Procedures  Procedures         ED Course  ED Course as of 05/09/23 2248   Tue May 09, 2023   1903 CT abdomen pelvis with contrast  IMPRESSION:     Few prominent proximal fluid-filled small bowel loops in a nonobstructive pattern  Focal enteritis suspected      Solitary gallstone in the gallbladder with no signs of cholecystitis      Stool in the ascending colon  63 Spoke with surgical AP Germaine Zhu via TT  Chloe Skelton will be down to see patient  SBIRT 20yo+    Flowsheet Row Most Recent Value   Initial Alcohol Screen: US AUDIT-C     1  How often do you have a drink containing alcohol? 3 Filed at: 05/09/2023 1526   2  How many drinks containing alcohol do you have on a typical day you are drinking? 1 Filed at: 05/09/2023 1526   3a  Male UNDER 65: How often do you have five or more drinks on one occasion? 0 Filed at: 05/09/2023 1526   Audit-C Score 4 Filed at: 05/09/2023 1526   MISTY: How many times in the past year have you    Used an illegal drug or used a prescription medication for non-medical reasons? Never Filed at: 05/09/2023 1526                    Medical Decision Making  68-year-old male presents to the ED for evaluation of right upper quadrant pain that began this morning  CBC unremarkable, CMP unremarkable, lipase unremarkable  Patient did report transient improvement in pain levels with Toradol but states pain returned shortly thereafter  CT abdomen pelvis did show gallstones without signs of cholecystitis  Discussed case with surgical AP Germaine Zhu who reviewed imaging and advised admission under Dr Zac Castro with general surgery  Amount and/or Complexity of Data Reviewed  Labs: ordered  Radiology: ordered  Decision-making details documented in ED Course  Risk  Prescription drug management  Decision regarding hospitalization            Disposition  Final diagnoses:   Right upper quadrant abdominal pain   Calculus of gallbladder     Time reflects when diagnosis was documented in both MDM as applicable and the Disposition within this note     Time User Action Codes Description Comment    5/9/2023  8:11 PM Eriberto Bill Add [R10 11] Right upper quadrant abdominal pain     5/9/2023  8:11 PM Eriberto Bill Add [K80 20] Calculus of gallbladder       ED Disposition     ED Disposition   Admit    Condition   Stable    Date/Time   Tue May 9, 2023  8:11 PM    Comment   Case was discussed with Sheila Gates and the patient's admission status was agreed to be Admission Status: inpatient status to the service of Dr Ruth Carrington  Follow-up Information    None         There are no discharge medications for this patient  No discharge procedures on file      PDMP Review     None          ED Provider  Electronically Signed by           Emily Fritz PA-C  05/09/23 6516

## 2023-05-09 NOTE — PROGRESS NOTES
[address]        NAME: Davide George is a 38 y o  male  : 1980    MRN: [id number]  DATE: May 9, 2023  TIME: 2:48 PM    Assessment and Plan   Right upper quadrant abdominal pain [R10 11]  1  Right upper quadrant abdominal pain              Patient Instructions       Follow up with PCP in 3-5 days  Proceed to  ER if symptoms worsen  Chief Complaint     Chief Complaint   Patient presents with   • Abdominal Pain     Pt reports abdominal pain of increasing severity all day, had a solid bowel movement earlier  History of Present Illness       White upper quadrant pains beginning this morning  No previous history of abdominal pains  No history of heartburn or colitis indigestion other abdominal issues  No significant medications  He has not been overusing caffeine  He wants today did not help the pains  He is not burping no nausea vomiting diarrhea or constipation  Review of Systems   Review of Systems   Gastrointestinal: Positive for abdominal pain  Negative for constipation, diarrhea, rectal pain and vomiting           Current Medications       Current Outpatient Medications:   •  aspirin 81 mg chewable tablet, Chew 1 tablet (81 mg total) 2 (two) times a day (Patient not taking: Reported on 2023), Disp: 90 tablet, Rfl: 0  •  celecoxib (CeleBREX) 200 mg capsule, Take 1 capsule (200 mg total) by mouth daily (Patient not taking: Reported on 2023), Disp: 30 capsule, Rfl: 1  •  cyclobenzaprine (FLEXERIL) 10 mg tablet, Take 1 tablet by mouth 3 (three) times a day as needed (Patient not taking: Reported on 2023), Disp: , Rfl:   •  HYDROcodone-acetaminophen (XODOL) 5-300 MG per tablet, Take 1 tablet by mouth (Patient not taking: Reported on 2023), Disp: , Rfl:   •  methocarbamol (ROBAXIN) 500 mg tablet, Take 1 tablet (500 mg total) by mouth every 6 (six) hours as needed for muscle spasms (Patient not taking: Reported on 2023), Disp: 40 tablet, Rfl: 0  •  traMADol (ULTRAM) 50 mg tablet, Take 50 mg by mouth every 6 (six) hours as needed for moderate pain (Patient not taking: Reported on 5/9/2023), Disp: , Rfl:     Current Allergies     Allergies as of 05/09/2023   • (No Known Allergies)            The following portions of the patient's history were reviewed and updated as appropriate: allergies, current medications, past family history, past medical history, past social history, past surgical history and problem list      Past Medical History:   Diagnosis Date   • Arthritis     R TKR today 3/2/2018   • Chronic pain disorder    • Contact lens overwear, bilateral    • History of heart murmur in childhood        Past Surgical History:   Procedure Laterality Date   • ANTERIOR CRUCIATE LIGAMENT REPAIR Left    • KNEE ARTHROSCOPY Right    • KNEE SURGERY Right     with hardware   • TOTAL KNEE ARTHROPLASTY Right 3/2/2018    Procedure: ARTHROPLASTY KNEE TOTAL,;  Surgeon: Gila Wilhelm MD;  Location: [facility];  Service: Orthopedics       No family history on file  Medications have been verified  Objective   /75   Pulse 77   Temp (!) 97 °F (36 1 °C)   Resp 16   SpO2 (!) 18%   No LMP for male patient  Physical Exam     Physical Exam  Abdominal:      Comments: Right upper quadrant abdominal pain on palpation  No liver enlargement  The other quadrants are nontender to palpation  Epigastrium is not tender to palpation

## 2023-05-10 ENCOUNTER — ANESTHESIA (INPATIENT)
Dept: PERIOP | Facility: HOSPITAL | Age: 43
End: 2023-05-10

## 2023-05-10 ENCOUNTER — ANESTHESIA EVENT (INPATIENT)
Dept: PERIOP | Facility: HOSPITAL | Age: 43
End: 2023-05-10

## 2023-05-10 VITALS
RESPIRATION RATE: 12 BRPM | TEMPERATURE: 97.2 F | OXYGEN SATURATION: 94 % | DIASTOLIC BLOOD PRESSURE: 95 MMHG | HEART RATE: 80 BPM | HEIGHT: 70 IN | WEIGHT: 251.77 LBS | SYSTOLIC BLOOD PRESSURE: 145 MMHG | BODY MASS INDEX: 36.04 KG/M2

## 2023-05-10 LAB
ALBUMIN SERPL BCP-MCNC: 4.1 G/DL (ref 3.5–5)
ALP SERPL-CCNC: 52 U/L (ref 34–104)
ALT SERPL W P-5'-P-CCNC: 20 U/L (ref 7–52)
ANION GAP SERPL CALCULATED.3IONS-SCNC: 8 MMOL/L (ref 4–13)
AST SERPL W P-5'-P-CCNC: 15 U/L (ref 13–39)
BASOPHILS # BLD AUTO: 0.03 THOUSANDS/ÂΜL (ref 0–0.1)
BASOPHILS NFR BLD AUTO: 0 % (ref 0–1)
BILIRUB SERPL-MCNC: 0.61 MG/DL (ref 0.2–1)
BUN SERPL-MCNC: 13 MG/DL (ref 5–25)
CALCIUM SERPL-MCNC: 9.2 MG/DL (ref 8.4–10.2)
CHLORIDE SERPL-SCNC: 103 MMOL/L (ref 96–108)
CO2 SERPL-SCNC: 25 MMOL/L (ref 21–32)
CREAT SERPL-MCNC: 0.77 MG/DL (ref 0.6–1.3)
EOSINOPHIL # BLD AUTO: 0.03 THOUSAND/ÂΜL (ref 0–0.61)
EOSINOPHIL NFR BLD AUTO: 0 % (ref 0–6)
ERYTHROCYTE [DISTWIDTH] IN BLOOD BY AUTOMATED COUNT: 13.2 % (ref 11.6–15.1)
GFR SERPL CREATININE-BSD FRML MDRD: 110 ML/MIN/1.73SQ M
GLUCOSE SERPL-MCNC: 111 MG/DL (ref 65–140)
HCT VFR BLD AUTO: 41.3 % (ref 36.5–49.3)
HGB BLD-MCNC: 13.8 G/DL (ref 12–17)
IMM GRANULOCYTES # BLD AUTO: 0.03 THOUSAND/UL (ref 0–0.2)
IMM GRANULOCYTES NFR BLD AUTO: 0 % (ref 0–2)
LYMPHOCYTES # BLD AUTO: 1.47 THOUSANDS/ÂΜL (ref 0.6–4.47)
LYMPHOCYTES NFR BLD AUTO: 18 % (ref 14–44)
MCH RBC QN AUTO: 28 PG (ref 26.8–34.3)
MCHC RBC AUTO-ENTMCNC: 33.4 G/DL (ref 31.4–37.4)
MCV RBC AUTO: 84 FL (ref 82–98)
MONOCYTES # BLD AUTO: 0.65 THOUSAND/ÂΜL (ref 0.17–1.22)
MONOCYTES NFR BLD AUTO: 8 % (ref 4–12)
NEUTROPHILS # BLD AUTO: 6.18 THOUSANDS/ÂΜL (ref 1.85–7.62)
NEUTS SEG NFR BLD AUTO: 74 % (ref 43–75)
NRBC BLD AUTO-RTO: 0 /100 WBCS
PLATELET # BLD AUTO: 188 THOUSANDS/UL (ref 149–390)
PMV BLD AUTO: 11.6 FL (ref 8.9–12.7)
POTASSIUM SERPL-SCNC: 3.9 MMOL/L (ref 3.5–5.3)
PROT SERPL-MCNC: 6.5 G/DL (ref 6.4–8.4)
RBC # BLD AUTO: 4.93 MILLION/UL (ref 3.88–5.62)
SODIUM SERPL-SCNC: 136 MMOL/L (ref 135–147)
WBC # BLD AUTO: 8.39 THOUSAND/UL (ref 4.31–10.16)

## 2023-05-10 RX ORDER — HYDROMORPHONE HCL IN WATER/PF 6 MG/30 ML
0.2 PATIENT CONTROLLED ANALGESIA SYRINGE INTRAVENOUS EVERY 4 HOURS PRN
Status: DISCONTINUED | OUTPATIENT
Start: 2023-05-10 | End: 2023-05-10

## 2023-05-10 RX ORDER — CEFAZOLIN SODIUM 2 G/50ML
SOLUTION INTRAVENOUS AS NEEDED
Status: DISCONTINUED | OUTPATIENT
Start: 2023-05-10 | End: 2023-05-10

## 2023-05-10 RX ORDER — ROCURONIUM BROMIDE 10 MG/ML
INJECTION, SOLUTION INTRAVENOUS AS NEEDED
Status: DISCONTINUED | OUTPATIENT
Start: 2023-05-10 | End: 2023-05-10

## 2023-05-10 RX ORDER — ONDANSETRON 2 MG/ML
4 INJECTION INTRAMUSCULAR; INTRAVENOUS ONCE AS NEEDED
Status: DISCONTINUED | OUTPATIENT
Start: 2023-05-10 | End: 2023-05-10 | Stop reason: HOSPADM

## 2023-05-10 RX ORDER — HYDROMORPHONE HCL/PF 1 MG/ML
SYRINGE (ML) INJECTION AS NEEDED
Status: DISCONTINUED | OUTPATIENT
Start: 2023-05-10 | End: 2023-05-10

## 2023-05-10 RX ORDER — FENTANYL CITRATE/PF 50 MCG/ML
50 SYRINGE (ML) INJECTION
Status: DISCONTINUED | OUTPATIENT
Start: 2023-05-10 | End: 2023-05-10 | Stop reason: HOSPADM

## 2023-05-10 RX ORDER — HYDROMORPHONE HCL/PF 1 MG/ML
1 SYRINGE (ML) INJECTION EVERY 4 HOURS PRN
Status: DISCONTINUED | OUTPATIENT
Start: 2023-05-10 | End: 2023-05-10

## 2023-05-10 RX ORDER — KETOROLAC TROMETHAMINE 30 MG/ML
INJECTION, SOLUTION INTRAMUSCULAR; INTRAVENOUS AS NEEDED
Status: DISCONTINUED | OUTPATIENT
Start: 2023-05-10 | End: 2023-05-10

## 2023-05-10 RX ORDER — HYDROMORPHONE HCL/PF 1 MG/ML
0.5 SYRINGE (ML) INJECTION
Status: DISCONTINUED | OUTPATIENT
Start: 2023-05-10 | End: 2023-05-10 | Stop reason: HOSPADM

## 2023-05-10 RX ORDER — FENTANYL CITRATE 50 UG/ML
INJECTION, SOLUTION INTRAMUSCULAR; INTRAVENOUS AS NEEDED
Status: DISCONTINUED | OUTPATIENT
Start: 2023-05-10 | End: 2023-05-10

## 2023-05-10 RX ORDER — LIDOCAINE HYDROCHLORIDE 10 MG/ML
INJECTION, SOLUTION EPIDURAL; INFILTRATION; INTRACAUDAL; PERINEURAL AS NEEDED
Status: DISCONTINUED | OUTPATIENT
Start: 2023-05-10 | End: 2023-05-10

## 2023-05-10 RX ORDER — TRAMADOL HYDROCHLORIDE 50 MG/1
50 TABLET ORAL EVERY 6 HOURS PRN
Qty: 12 TABLET | Refills: 0 | Status: SHIPPED | OUTPATIENT
Start: 2023-05-10 | End: 2023-05-13

## 2023-05-10 RX ORDER — LIDOCAINE HYDROCHLORIDE 10 MG/ML
0.5 INJECTION, SOLUTION EPIDURAL; INFILTRATION; INTRACAUDAL; PERINEURAL ONCE AS NEEDED
Status: DISCONTINUED | OUTPATIENT
Start: 2023-05-10 | End: 2023-05-10

## 2023-05-10 RX ORDER — OXYCODONE HYDROCHLORIDE 5 MG/1
5 TABLET ORAL EVERY 4 HOURS PRN
Status: DISCONTINUED | OUTPATIENT
Start: 2023-05-10 | End: 2023-05-10

## 2023-05-10 RX ORDER — HYDROMORPHONE HCL/PF 1 MG/ML
0.5 SYRINGE (ML) INJECTION EVERY 4 HOURS PRN
Status: DISCONTINUED | OUTPATIENT
Start: 2023-05-10 | End: 2023-05-10

## 2023-05-10 RX ORDER — SODIUM CHLORIDE, SODIUM LACTATE, POTASSIUM CHLORIDE, CALCIUM CHLORIDE 600; 310; 30; 20 MG/100ML; MG/100ML; MG/100ML; MG/100ML
125 INJECTION, SOLUTION INTRAVENOUS CONTINUOUS
Status: DISCONTINUED | OUTPATIENT
Start: 2023-05-10 | End: 2023-05-10

## 2023-05-10 RX ORDER — PROPOFOL 10 MG/ML
INJECTION, EMULSION INTRAVENOUS AS NEEDED
Status: DISCONTINUED | OUTPATIENT
Start: 2023-05-10 | End: 2023-05-10

## 2023-05-10 RX ORDER — TRAMADOL HYDROCHLORIDE 50 MG/1
50 TABLET ORAL EVERY 6 HOURS PRN
Status: DISCONTINUED | OUTPATIENT
Start: 2023-05-10 | End: 2023-05-10 | Stop reason: HOSPADM

## 2023-05-10 RX ORDER — METOCLOPRAMIDE HYDROCHLORIDE 5 MG/ML
10 INJECTION INTRAMUSCULAR; INTRAVENOUS ONCE AS NEEDED
Status: DISCONTINUED | OUTPATIENT
Start: 2023-05-10 | End: 2023-05-10 | Stop reason: HOSPADM

## 2023-05-10 RX ORDER — DEXAMETHASONE SODIUM PHOSPHATE 10 MG/ML
INJECTION, SOLUTION INTRAMUSCULAR; INTRAVENOUS AS NEEDED
Status: DISCONTINUED | OUTPATIENT
Start: 2023-05-10 | End: 2023-05-10

## 2023-05-10 RX ORDER — OXYCODONE HYDROCHLORIDE 5 MG/1
5 TABLET ORAL EVERY 6 HOURS PRN
Qty: 12 TABLET | Refills: 0 | Status: CANCELLED | OUTPATIENT
Start: 2023-05-10 | End: 2023-05-13

## 2023-05-10 RX ORDER — SODIUM CHLORIDE, SODIUM LACTATE, POTASSIUM CHLORIDE, CALCIUM CHLORIDE 600; 310; 30; 20 MG/100ML; MG/100ML; MG/100ML; MG/100ML
INJECTION, SOLUTION INTRAVENOUS CONTINUOUS PRN
Status: DISCONTINUED | OUTPATIENT
Start: 2023-05-10 | End: 2023-05-10

## 2023-05-10 RX ORDER — ONDANSETRON 2 MG/ML
INJECTION INTRAMUSCULAR; INTRAVENOUS AS NEEDED
Status: DISCONTINUED | OUTPATIENT
Start: 2023-05-10 | End: 2023-05-10

## 2023-05-10 RX ORDER — TRAMADOL HYDROCHLORIDE 50 MG/1
100 TABLET ORAL EVERY 6 HOURS PRN
Status: DISCONTINUED | OUTPATIENT
Start: 2023-05-10 | End: 2023-05-10 | Stop reason: HOSPADM

## 2023-05-10 RX ORDER — PROMETHAZINE HYDROCHLORIDE 25 MG/ML
25 INJECTION, SOLUTION INTRAMUSCULAR; INTRAVENOUS ONCE AS NEEDED
Status: DISCONTINUED | OUTPATIENT
Start: 2023-05-10 | End: 2023-05-10 | Stop reason: HOSPADM

## 2023-05-10 RX ORDER — ACETAMINOPHEN 325 MG/1
650 TABLET ORAL EVERY 6 HOURS PRN
Qty: 60 TABLET | Refills: 0
Start: 2023-05-10

## 2023-05-10 RX ORDER — HYDROMORPHONE HCL/PF 1 MG/ML
0.5 SYRINGE (ML) INJECTION EVERY 2 HOUR PRN
Status: DISCONTINUED | OUTPATIENT
Start: 2023-05-10 | End: 2023-05-10 | Stop reason: HOSPADM

## 2023-05-10 RX ORDER — OXYCODONE HYDROCHLORIDE 5 MG/1
10 TABLET ORAL EVERY 4 HOURS PRN
Status: DISCONTINUED | OUTPATIENT
Start: 2023-05-10 | End: 2023-05-10

## 2023-05-10 RX ORDER — MIDAZOLAM HYDROCHLORIDE 2 MG/2ML
INJECTION, SOLUTION INTRAMUSCULAR; INTRAVENOUS AS NEEDED
Status: DISCONTINUED | OUTPATIENT
Start: 2023-05-10 | End: 2023-05-10

## 2023-05-10 RX ORDER — HYDROMORPHONE HCL/PF 1 MG/ML
0.5 SYRINGE (ML) INJECTION
Status: DISCONTINUED | OUTPATIENT
Start: 2023-05-10 | End: 2023-05-10

## 2023-05-10 RX ORDER — HYDROMORPHONE HCL/PF 1 MG/ML
0.5 SYRINGE (ML) INJECTION ONCE
Status: COMPLETED | OUTPATIENT
Start: 2023-05-10 | End: 2023-05-10

## 2023-05-10 RX ADMIN — CEFAZOLIN SODIUM 2000 MG: 2 SOLUTION INTRAVENOUS at 12:45

## 2023-05-10 RX ADMIN — METRONIDAZOLE 500 MG: 500 INJECTION, SOLUTION INTRAVENOUS at 12:45

## 2023-05-10 RX ADMIN — ROCURONIUM BROMIDE 50 MG: 10 INJECTION, SOLUTION INTRAVENOUS at 09:44

## 2023-05-10 RX ADMIN — ROCURONIUM BROMIDE 30 MG: 10 INJECTION, SOLUTION INTRAVENOUS at 10:15

## 2023-05-10 RX ADMIN — HYDROMORPHONE HYDROCHLORIDE 1 MG: 1 INJECTION, SOLUTION INTRAMUSCULAR; INTRAVENOUS; SUBCUTANEOUS at 07:12

## 2023-05-10 RX ADMIN — SODIUM CHLORIDE, SODIUM LACTATE, POTASSIUM CHLORIDE, AND CALCIUM CHLORIDE: .6; .31; .03; .02 INJECTION, SOLUTION INTRAVENOUS at 09:44

## 2023-05-10 RX ADMIN — KETOROLAC TROMETHAMINE 30 MG: 30 INJECTION, SOLUTION INTRAMUSCULAR; INTRAVENOUS at 10:27

## 2023-05-10 RX ADMIN — FENTANYL CITRATE 100 MCG: 50 INJECTION, SOLUTION INTRAMUSCULAR; INTRAVENOUS at 09:44

## 2023-05-10 RX ADMIN — DEXAMETHASONE SODIUM PHOSPHATE 10 MG: 10 INJECTION, SOLUTION INTRAMUSCULAR; INTRAVENOUS at 09:44

## 2023-05-10 RX ADMIN — HYDROMORPHONE HYDROCHLORIDE 0.5 MG: 1 INJECTION, SOLUTION INTRAMUSCULAR; INTRAVENOUS; SUBCUTANEOUS at 01:24

## 2023-05-10 RX ADMIN — PROPOFOL 200 MG: 10 INJECTION, EMULSION INTRAVENOUS at 09:44

## 2023-05-10 RX ADMIN — ONDANSETRON 4 MG: 2 INJECTION INTRAMUSCULAR; INTRAVENOUS at 10:27

## 2023-05-10 RX ADMIN — SODIUM CHLORIDE, SODIUM GLUCONATE, SODIUM ACETATE, POTASSIUM CHLORIDE, MAGNESIUM CHLORIDE, SODIUM PHOSPHATE, DIBASIC, AND POTASSIUM PHOSPHATE 125 ML/HR: .53; .5; .37; .037; .03; .012; .00082 INJECTION, SOLUTION INTRAVENOUS at 01:05

## 2023-05-10 RX ADMIN — HYDROMORPHONE HYDROCHLORIDE 0.5 MG: 1 INJECTION, SOLUTION INTRAMUSCULAR; INTRAVENOUS; SUBCUTANEOUS at 10:13

## 2023-05-10 RX ADMIN — MIDAZOLAM 2 MG: 1 INJECTION INTRAMUSCULAR; INTRAVENOUS at 09:38

## 2023-05-10 RX ADMIN — METRONIDAZOLE 500 MG: 500 INJECTION, SOLUTION INTRAVENOUS at 05:55

## 2023-05-10 RX ADMIN — SUGAMMADEX 200 MG: 100 INJECTION, SOLUTION INTRAVENOUS at 10:27

## 2023-05-10 RX ADMIN — CEFAZOLIN SODIUM 2000 MG: 2 SOLUTION INTRAVENOUS at 04:48

## 2023-05-10 RX ADMIN — HYDROMORPHONE HYDROCHLORIDE 0.2 MG: 0.2 INJECTION, SOLUTION INTRAMUSCULAR; INTRAVENOUS; SUBCUTANEOUS at 04:59

## 2023-05-10 RX ADMIN — LIDOCAINE HYDROCHLORIDE 50 MG: 10 INJECTION, SOLUTION EPIDURAL; INFILTRATION; INTRACAUDAL; PERINEURAL at 09:44

## 2023-05-10 RX ADMIN — METRONIDAZOLE 500 MG: 500 INJECTION, SOLUTION INTRAVENOUS at 00:20

## 2023-05-10 RX ADMIN — CEFAZOLIN SODIUM 2000 MG: 2 SOLUTION INTRAVENOUS at 09:43

## 2023-05-10 NOTE — PLAN OF CARE
Problem: PAIN - ADULT  Goal: Verbalizes/displays adequate comfort level or baseline comfort level  Description: Interventions:  - Encourage patient to monitor pain and request assistance  - Assess pain using appropriate pain scale  - Administer analgesics based on type and severity of pain and evaluate response  - Implement non-pharmacological measures as appropriate and evaluate response  - Consider cultural and social influences on pain and pain management  - Notify physician/advanced practitioner if interventions unsuccessful or patient reports new pain  Outcome: Progressing     Problem: INFECTION - ADULT  Goal: Absence or prevention of progression during hospitalization  Description: INTERVENTIONS:  - Assess and monitor for signs and symptoms of infection  - Monitor lab/diagnostic results  - Monitor all insertion sites, i e  indwelling lines, tubes, and drains  - Monitor endotracheal if appropriate and nasal secretions for changes in amount and color  - [place] appropriate cooling/warming therapies per order  - Administer medications as ordered  - Instruct and encourage patient and family to use good hand hygiene technique  - Identify and instruct in appropriate isolation precautions for identified infection/condition  Outcome: Progressing  Goal: Absence of fever/infection during neutropenic period  Description: INTERVENTIONS:  - Monitor WBC    Outcome: Progressing     Problem: SAFETY ADULT  Goal: Patient will remain free of falls  Description: INTERVENTIONS:  - Educate patient/family on patient safety including physical limitations  - Instruct patient to call for assistance with activity   - Consult OT/PT to assist with strengthening/mobility   - Keep Call bell within reach  - Keep bed low and locked with side rails adjusted as appropriate  - Keep care items and personal belongings within reach  - Initiate and maintain comfort rounds  - Make Fall Risk Sign visible to staff  - Offer Toileting every  Hours, in advance of need  - Initiate/Maintain alarm  - Obtain necessary fall risk management equipment:   - Apply yellow socks and bracelet for high fall risk patients  - Consider moving patient to room near nurses station  Outcome: Progressing  Goal: Maintain or return to baseline ADL function  Description: INTERVENTIONS:  -  Assess patient's ability to carry out ADLs; assess patient's baseline for ADL function and identify physical deficits which impact ability to perform ADLs (bathing, care of mouth/teeth, toileting, grooming, dressing, etc )  - Assess/evaluate cause of self-care deficits   - Assess range of motion  - Assess patient's mobility; develop plan if impaired  - Assess patient's need for assistive devices and provide as appropriate  - Encourage maximum independence but intervene and supervise when necessary  - Involve family in performance of ADLs  - Assess for home care needs following discharge   - Consider OT consult to assist with ADL evaluation and planning for discharge  - Provide patient education as appropriate  Outcome: Progressing  Goal: Maintains/Returns to pre admission functional level  Description: INTERVENTIONS:  - Perform BMAT or MOVE assessment daily    - Set and communicate daily mobility goal to care team and patient/family/caregiver  - Collaborate with rehabilitation services on mobility goals if consulted  - Perform Range of Motion  times a day  - Reposition patient every  hours    - Dangle patient  times a day  - Stand patient  times a day  - Ambulate patient  times a day  - Out of bed to chair  times a day   - Out of bed for morris times a day  - Out of bed for toileting  - Record patient progress and toleration of activity level   Outcome: Progressing     Problem: DISCHARGE PLANNING  Goal: Discharge to home or other facility with appropriate resources  Description: INTERVENTIONS:  - Identify barriers to discharge w/patient and caregiver  - Arrange for needed discharge resources and transportation as appropriate  - Identify discharge learning needs (meds, wound care, etc )  - Arrange for interpretive services to assist at discharge as needed  - Refer to Case Management Department for coordinating discharge planning if the patient needs post-hospital services based on physician/advanced practitioner order or complex needs related to functional status, cognitive ability, or social support system  Outcome: Progressing     Problem: Knowledge Deficit  Goal: Patient/family/caregiver demonstrates understanding of disease process, treatment plan, medications, and discharge instructions  Description: Complete learning assessment and assess knowledge base    Interventions:  - Provide teaching at level of understanding  - Provide teaching via preferred learning methods  Outcome: Progressing

## 2023-05-10 NOTE — INTERIM OP NOTE
CHOLECYSTECTOMY LAPAROSCOPIC  Postoperative Note  PATIENT NAME: Kira Bray  : 1980  MRN: [id number]  [facility] OR ROOM 01    Surgery Date: 5/10/2023    Preop Diagnosis:  Right upper quadrant abdominal pain [R10 11]  Calculus of gallbladder [K80 20]    Post-Op Diagnosis Codes:     * Right upper quadrant abdominal pain [R10 11]     * Calculus of gallbladder [K80 20]    Procedure(s) (LRB):  CHOLECYSTECTOMY LAPAROSCOPIC (N/A)    Surgeon(s) and Role:     * Jason Dumont MD - Primary     Jadyn Castaneda PA-C - Assisting    Specimens:  ID Type Source Tests Collected by Time Destination   1 :  Tissue Gallbladder TISSUE EXAM Jason Dumont MD 5/10/2023 1002        Estimated Blood Loss:   Minimal    Anesthesia Type:   General ETA    Findings:   As above  Thickened and edematous BG wall, hydrops, with large gallstone    Complications:   None      SIGNATURE: Rick Castaneda PA-C   DATE: May 10, 2023   TIME: 10:50 AM

## 2023-05-10 NOTE — PROGRESS NOTES
Pt received from PACU  Vitals stable  No c/o pain at this time  Menu provided at pt request  Spouse at bedside  Will continue to monitor

## 2023-05-10 NOTE — DISCHARGE INSTR - AVS FIRST PAGE
Negrita Clarke Instructions  Dr Sonja Rocha MD, [facility]  [phone]    1  General: You will feel pulling sensations around the wound or funny aches and pains around the incisions  This is normal  Even minor surgery is a change in your body and this is your body's way of reacting to it  If you have had abdominal surgery, it may help to support the incision with a small pillow or blanket for comfort when moving or coughing  2  Wound care:  Okay to shower  The glue will fall off over the next week or 2  Use ice for the first 5 days after surgery  Do not use for longer than 20 minutes at a time  Use ice 5 times per day  3  Water: You may shower over the wounds  Do not bathe or use a pool or hot tub until cleared by the physician  If you were discharged with a drain, make sure drain site is covered with plastic wrap before showering  4  Activity: You may go up and down stairs, walk as much as you are comfortable, but walk at least 3 times each day  If you have had abdominal surgery, do not lift anything heavier than 15 lbs for the 1st 2 weeks and 25 lbs for weeks 3 and 4      5  Diet: You may resume a regular low-fat diet  If you had a same-day surgery or overnight stay surgery, you may wish to eat lightly for a few days: soups, crackers, and sandwiches  You may resume a regular diet when ready  6  Medications: Resume all of your previous medications, unless told otherwise by the doctor  Avoid aspirin products for 2-3 days after the date of surgery  You may, at that time, begin to take them again  Use Tylenol and Ibuprofen for pain control  You may alternate these medications every 3 hours  For example: you may take Tylenol at noon, Ibuprofen at 3:00 p m , and Tylenol again at 6:00 p m , etc   You should use ice to assist with pain control as above  You do not need to take narcotic pain medication unless you are having significant pain         7  Driving: You will need someone to drive you home on the day of surgery or discharge  Do not drive or make any important decisions while on narcotic pain medication or 24 hours and after anesthesia or sedation for surgery  Generally, you may drive when your off all narcotic pain medications and you are comfortable  8  Upset Stomach: You may take Maalox, Tums, or similar items for an upset stomach  If your narcotic pain medication causes an upset stomach, do not take it on an empty stomach  Try taking it with at least some crackers or toast      9  Constipation: Patients often experience constipation after surgery  You may take over-the-counter medication for this, such as Metamucil, Senokot, Dulcolax, milk of magnesia, etc  You may take a suppository unless you have had anorectal surgery such as a procedure on your hemorrhoids  If you experience significant nausea or vomiting after abdominal surgery, call the office before trying any of these medications  10  Call the office: If you are experiencing any of the following: fevers above 101 5°, significant nausea or vomiting, if the wound develops drainage and/or there is excessive redness around the wound, or if you have significant diarrhea or other worsening symptoms  11  Pain: You may be given a prescription for pain medication  This will be sent to your pharmacy prior to discharge  12  Sexual Activity: You may resume sexual activity when you feel ready and comfortable and your incision is sealed and healed without apparent infection risk  13  Urination: If you have not urinated in 6 hours, go directly to the ER for evaluation for urinary retention  14  Follow-up in 2 weeks

## 2023-05-10 NOTE — OP NOTE
CHOLECYSTECTOMY LAPAROSCOPIC  Postoperative Note  PATIENT NAME: Miguel A Menard  : 1980  MRN: [id number]  [facility] OR ROOM 01    Surgery Date: 5/10/2023    Pre-op Dx:  Right upper quadrant abdominal pain [R10 11]  Calculus of gallbladder [K80 20]    Post-Op Diagnosis Codes:     * Right upper quadrant abdominal pain [R10 11]     * Calculus of gallbladder [K80 20]    Procedure(s):  CHOLECYSTECTOMY LAPAROSCOPIC    Surgeon(s) and Role:     * Tato Ferrell MD - Primary     * Mohit Fisher PA-C - Assisting    The Physician Assistant was medically necessary for surgical safety the case including suturing, retraction, and hemostasis  Specimens:  ID Type Source Tests Collected by Time Destination   1 :  Tissue Gallbladder TISSUE EXAM Tato Ferrell MD 5/10/2023 1002        Estimated Blood Loss:   Minimal    Anesthesia Type:   General     Procedure: The patient was seen again in the Holding Room  The risks, benefits, complications, treatment options, and expected outcomes were discussed with the patient  The possibilities of reaction to medication, pulmonary aspiration, perforation of viscus, bleeding, recurrent infection, finding a normal gallbladder, the need for additional procedures, failure to diagnose a condition, the possible need to convert to an open procedure, and creating a complication requiring transfusion or operation were discussed with the patient  The patient and/or family concurred with the proposed plan, giving informed consent  The site of surgery properly noted/marked  The patient was taken to Operating Room, identified as Miguel A Menard  and the procedure verified as Laparoscopic Cholecystectomy with possible Intraoperative Cholangiogram  A Time Out was held after prepping and draping in sterile fashion  The above information was confirmed  Prior to the induction of general anesthesia, antibiotic prophylaxis was administered   General endotracheal anesthesia was then administered and tolerated well  After the induction, the abdomen was prepped in the usual sterile fashion  The patient was positioned in the supine position, along with some reverse Trendelenburg  Local anesthetic agent was injected into the skin near the umbilicus and an incision made  The midline fascia was incised and the open technique was used to introduce a  port under direct vision  Pneumoperitoneum was then created with CO2 and tolerated well without any adverse changes in the patient's vital signs  Additional trocars were introduced under direct vision  All skin incisions were infiltrated with a local anesthetic agent before making the incision and placing the trocars  The patient was placed in the head up, left side down position  The gallbladder was identified, the fundus grasped and retracted cephalad and laterally  Adhesions were lysed bluntly and with the electrocautery or harmonic scapel  where indicated, taking care not to injure any adjacent organs or viscus  The infundibulum was grasped and retracted laterally, exposing the peritoneum overlying the triangle of Calot  This was then dissected anteriorily and posteriorly from the gallbladder,  and exposed in a blunt fashion or using cautery or harmonic scapel where appropriate  The cystic duct was clearly identified and  dissected circumferentially, as was the cystic artery, as the only two tubular structures leading into the gallbladder   The critical view of the [address] was identified and opened  The posterior aspect of the gallbladder was lifted off the cystic plate, to insure that there were no posterior structres behind the gallbladder or leading into the liver  Once this was all clearly identified, the cystic duct was then doubly ligated with surgical clips and/or Endoloop suture on the patient side and singly clipped on the gallbladder side and divided  The cystic artery was re-identified,  ligated with clips and divided as well   If "necessary, the cystic duct was \"miked\" back of any stones felt in the cystic duct, prior to clipping the patient side of the duct  The gallbladder was dissected from the liver bed in retrograde fashion with the electrocautery and/or harmonic scalpel where appropriate  The gallbladder was removed, via an endopouch, through the umbilical port  The liver bed was irrigated and inspected  Hemostasis was achieved with the electrocautery  Copious irrigation was utilized and was repeatedly aspirated until clear  Pneumoperitoneum was completely reduced after viewing removal of the trocars under direct vision  The wound was thoroughly irrigated and any fascia defect was then closed with a figure of eight suture 0-vicryl; the skin was then closed with 4-0 monocryl and a sterile dressings were applied  Instrument, sponge, and needle counts were correct at closure and at the conclusion of the case  This text is generated with voice recognition software  There may be translation, syntax,  or grammatical errors  If you have any questions, please contact the dictating provider       Complications: None    Condition: Stable to PACU    SIGNATURE: Narciso Camarillo MD   DATE: May 10, 2023   TIME: 2:24 PM  "

## 2023-05-10 NOTE — NURSING NOTE
Pt tolerated dinner  Voiding  Cleared for discharge home at this time  D/C instructions reviewed with and given to pt  Script sent to preferred pharmacy  Work note also provided  All questions and concerns addressed at this time  IV site removed  Belongings gathered  Pt dressed  Pt escorted home by spouse

## 2023-05-10 NOTE — PROGRESS NOTES
"Progress Note - General Surgery   Rula Thomson 38 y o  male MRN: [id number]  Unit/Bed#: OR POOL Encounter: [id number]    Assessment/Plan:  Acute cholecystitis vs symptomatic cholelithiasis  -CT demonstrates approximately 3 cm solitary gallstone in neck of the gallbladder  No evidence of definitive acute cholecystitis but continues with intractable RUQ pain  Will proceed with laparoscopic cholecystectomy today  Procedure discussed in detail with the patient as well as possible risks and complications and written consent has been obtained  All questions answered  -No leukocytosis, LFTs/T bili WNL, VSS- continue to monitor  -NPO, IVF  -Pain control PRN  -IV abx  -Likely discharge later today    Elevated blood pressure  -161/95 this morning  -Possibly related to pain however possible underlying hypertension  -Patient should have blood pressure followed after discharge        Subjective/Objective   Chief Complaint: RUQ pain    Subjective: Pt continues with RUQ pain and chills this AM  Reports some relief with pain meds but not fully resolved  Denies fevers, N/V, SOB, chest pain, indigestion, changes in bowel habits or urination  No previous abdominal surgeries  Denies previous similar episodes  Objective:     Blood pressure 161/95, pulse 89, temperature 98 4 °F (36 9 °C), temperature source Oral, resp  rate 16, height 5' 10\" (1 778 m), weight 114 kg (251 lb 12 3 oz), SpO2 98 %  ,Body mass index is 36 12 kg/m²      No intake or output data in the 24 hours ending 05/10/23 0858    Invasive Devices     Peripheral Intravenous Line  Duration           Peripheral IV 05/09/23 Left Antecubital <1 day                Physical Exam: /95   Pulse 89   Temp 98 4 °F (36 9 °C) (Oral)   Resp 16   Ht 5' 10\" (1 778 m)   Wt 114 kg (251 lb 12 3 oz)   SpO2 98%   BMI 36 12 kg/m²   General appearance: alert and oriented, in no acute distress  Head: Normocephalic, without obvious abnormality, atraumatic  Lungs: clear to " auscultation bilaterally  Heart: regular rate and rhythm, S1, S2 normal, no murmur, click, rub or gallop  Abdomen: RUQ tenderness, - Porter's sign,  voluntary guarding  Soft abdomen, normal bowel sounds, no distention  Extremities: extremities normal, warm and well-perfused; no cyanosis, clubbing, or edema  Neurologic: Grossly normal    Lab, Imaging and other studies:  I have personally reviewed pertinent lab results    , CBC:   Lab Results   Component Value Date    WBC 8 39 05/10/2023    HGB 13 8 05/10/2023    HCT 41 3 05/10/2023    MCV 84 05/10/2023     05/10/2023    MCH 28 0 05/10/2023    MCHC 33 4 05/10/2023    RDW 13 2 05/10/2023    MPV 11 6 05/10/2023    NRBC 0 05/10/2023   , CMP:   Lab Results   Component Value Date    SODIUM 136 05/10/2023    K 3 9 05/10/2023     05/10/2023    CO2 25 05/10/2023    BUN 13 05/10/2023    CREATININE 0 77 05/10/2023    CALCIUM 9 2 05/10/2023    AST 15 05/10/2023    ALT 20 05/10/2023    ALKPHOS 52 05/10/2023    EGFR 110 05/10/2023   , Lipase:   Lab Results   Component Value Date    LIPASE 31 05/09/2023     VTE Pharmacologic Prophylaxis: Enoxaparin (Lovenox)  VTE Mechanical Prophylaxis: sequential compression device     Coit Christin Hunt-Bria\

## 2023-05-10 NOTE — CASE MANAGEMENT
Case Management Progress Note    Patient name Fernando Da Silva  Location [address] 322/-[id number] MRN [id number]  : 1980 Date 5/10/2023       LOS (days): 1  Geometric Mean LOS (GMLOS) (days): 2 20  Days to GMLOS:        OBJECTIVE:     Current admission status: Outpatient Surgery  Preferred Pharmacy:   [facility]/pharmacy #[id number], [address]  Phone: [phone] Fax: [phone]    [facility]/pharmacy #[id number]- [place], [address] [place] [address] [address]  [address]  [facility] [address]  [place] [address]  Phone: [phone] Fax: [phone]    Primary Care Provider: No primary care provider on file  Primary Insurance: [facility]  Secondary Insurance:     PROGRESS NOTE:    Cm spoke with RN who reports pt is independent in room and presents with no cm d/c needs

## 2023-05-10 NOTE — H&P
H&P Exam - General Surgery   Fernando Da Silva 38 y o  male MRN: [id number]  Unit/Bed#: ED 05 Encounter: [id number]    Assessment/Plan     Assessment/Plan:    Cholelithiasis, RUQ pain:  - pain started this morning, has been constant and worsening since onset  - on exam, TTP in RUQ with voluntary guarding  Positive Porter's sign   - CT scan reviewed, there is a large gallstone in the neck of the gallbladder   - wbc, tbili, LFTs within normal limits  - given symptoms and clinical exam together with large stone at the neck of the gallbladder, general surgery service will admit with operative plan pending OR availability  Sips of clears now, npo midnight  Ancef/flagyl  MIVF  Trend labs and vitals  - discussed with on call surgeon  Discussed with patient who is in agreement  History of Present Illness   HPI:  Fernando Da Silva is a 38 y o  male who presents with acute onset right upper quadrant pain that started this morning  Patient states it started shortly after he woke up, rated at 2/10  He states the pain gradually worsened, he tried to eat lunch thinking this would help  States the pain had worsened after eating, prompting him to present to urgent care  He states he came to the ED after going to urgent care because the pain progressed to 10/10  He admits to chills and nausea  He denies fevers, vomiting, chest pain, breathing difficulties, changes in bowel habits  He states that he received toradol in the ed which brought his pain down to a 6/10, but shortly came back up to 9/10  He denies any previous abdominal surgeries  Does admit to some RUQ abdominal pain a week ago that was a 4/10 in intensity and lasted for about 6 hours  He does not take any daily medications, denies any known drug allergies  Review of Systems   Constitutional: Positive for appetite change and chills  Negative for fever  HENT: Negative for sore throat  Eyes: Negative for pain and visual disturbance     Respiratory: Negative for "cough and shortness of breath  Cardiovascular: Negative for chest pain and palpitations  Gastrointestinal: Positive for abdominal pain and nausea  Negative for constipation, diarrhea and vomiting  Genitourinary: Negative for difficulty urinating, dysuria and hematuria  Musculoskeletal: Negative for arthralgias and back pain  Skin: Negative for color change and rash  Neurological: Negative for dizziness, syncope and light-headedness  All other systems reviewed and are negative  Historical Information   Past Medical History:   Diagnosis Date   • Arthritis     R TKR today 3/2/2018   • Chronic pain disorder    • Contact lens overwear, bilateral    • History of heart murmur in childhood      Past Surgical History:   Procedure Laterality Date   • ANTERIOR CRUCIATE LIGAMENT REPAIR Left    • KNEE ARTHROSCOPY Right    • KNEE SURGERY Right     with hardware   • TOTAL KNEE ARTHROPLASTY Right 3/2/2018    Procedure: ARTHROPLASTY KNEE TOTAL,;  Surgeon: Hector Puri MD;  Location: [facility];  Service: Orthopedics     Social History   Social History     Substance and Sexual Activity   Alcohol Use Yes   • Alcohol/week: 24 0 standard drinks   • Types: 24 Cans of beer per week    Comment: per month     Social History     Substance and Sexual Activity   Drug Use No     Social History     Tobacco Use   Smoking Status Never   Smokeless Tobacco Never     E-Cigarette/Vaping   • E-Cigarette Use Never User      E-Cigarette/Vaping Substances     Family History: History reviewed  No pertinent family history      Meds/Allergies   PTA meds:   None     No Known Allergies    Objective   First Vitals:   Blood Pressure: 148/89 (05/09/23 1524)  Pulse: 76 (05/09/23 1524)  Temperature: (!) 97 4 °F (36 3 °C) (05/09/23 1524)  Temp Source: Temporal (05/09/23 1524)  Respirations: 18 (05/09/23 1524)  Height: 5' 10\" (177 8 cm) (05/09/23 1524)  Weight - Scale: 113 kg (250 lb) (05/09/23 1524)  SpO2: 100 % (05/09/23 1524)    Current Vitals: " "  Blood Pressure: 141/94 (05/09/23 1800)  Pulse: 96 (05/09/23 1800)  Temperature: (!) 97 4 °F (36 3 °C) (05/09/23 1524)  Temp Source: Temporal (05/09/23 1524)  Respirations: 18 (05/09/23 1800)  Height: 5' 10\" (177 8 cm) (05/09/23 1524)  Weight - Scale: 113 kg (250 lb) (05/09/23 1524)  SpO2: 94 % (05/09/23 1800)    No intake or output data in the 24 hours ending 05/09/23 2002    Invasive Devices     Peripheral Intravenous Line  Duration           Peripheral IV 05/09/23 Left Antecubital <1 day                Physical Exam  Vitals and nursing note reviewed  Constitutional:       General: He is in acute distress (in pain)  Appearance: Normal appearance  He is well-developed  HENT:      Head: Normocephalic and atraumatic  Eyes:      General: No scleral icterus  Extraocular Movements: Extraocular movements intact  Conjunctiva/sclera: Conjunctivae normal    Cardiovascular:      Rate and Rhythm: Normal rate and regular rhythm  Pulses: Normal pulses  Radial pulses are 2+ on the right side and 2+ on the left side  Dorsalis pedis pulses are 2+ on the right side and 2+ on the left side  Heart sounds: No murmur heard  Pulmonary:      Effort: Pulmonary effort is normal  No respiratory distress  Breath sounds: Normal breath sounds  Abdominal:      General: Bowel sounds are normal  There is no distension  Palpations: Abdomen is soft  Tenderness: There is abdominal tenderness in the right upper quadrant  There is guarding (voluntary)  There is no rebound  Positive signs include Porter's sign  Comments: Bowel sounds are active  Abdomen is soft  There is tenderness to palpation in the right upper quadrant and epigastrium, worse in ruq  There is voluntary guarding  Positive Medina sign  Musculoskeletal:         General: No swelling  Cervical back: Neck supple  Skin:     General: Skin is warm and dry        Capillary Refill: Capillary refill takes less than 2 " seconds  Neurological:      General: No focal deficit present  Mental Status: He is alert  Psychiatric:         Mood and Affect: Mood normal          Lab Results:   I have personally reviewed pertinent lab results  , CBC:   Lab Results   Component Value Date    WBC 9 99 05/09/2023    HGB 15 7 05/09/2023    HCT 46 7 05/09/2023    MCV 83 05/09/2023     05/09/2023    MCH 27 9 05/09/2023    MCHC 33 6 05/09/2023    RDW 13 0 05/09/2023    MPV 12 0 05/09/2023    NRBC 0 05/09/2023   , CMP:   Lab Results   Component Value Date    SODIUM 135 05/09/2023    K 4 2 05/09/2023    CL 98 05/09/2023    CO2 29 05/09/2023    BUN 15 05/09/2023    CREATININE 0 85 05/09/2023    CALCIUM 10 3 (H) 05/09/2023    AST 26 05/09/2023    ALT 29 05/09/2023    ALKPHOS 66 05/09/2023    EGFR 105 05/09/2023   , Lipase:   Lab Results   Component Value Date    LIPASE 31 05/09/2023     Imaging: I have personally reviewed pertinent reports   , I have personally reviewed pertinent films in PACS and CT independently reviewed, there is a large stone located at the neck of the gallbladder  EKG, Pathology, and Other Studies: I have personally reviewed pertinent reports        Code Status: No Order  Advance Directive and Living Will:      Power of :    POLST:      Germaine Zhu PA-C

## 2023-05-10 NOTE — ANESTHESIA PREPROCEDURE EVALUATION
Procedure:  CHOLECYSTECTOMY LAPAROSCOPIC (Abdomen)    Relevant Problems   ANESTHESIA (within normal limits)   (-) History of anesthesia complications      CARDIO   (-) Chest pain   (-) BAUTISTA (dyspnea on exertion)      MUSCULOSKELETAL   (+) Osteoarthritis of right knee      PULMONARY   (-) Shortness of breath   (-) URI (upper respiratory infection)        Physical Exam    Airway    Mallampati score: II  TM Distance: >3 FB  Neck ROM: full     Dental       Cardiovascular      Pulmonary      Other Findings        Anesthesia Plan  ASA Score- 2     Anesthesia Type- general with ASA Monitors  Additional Monitors:   Airway Plan: ETT  Plan Factors-Exercise tolerance (METS): >4 METS  Chart reviewed  Existing labs reviewed  Patient summary reviewed  Induction- intravenous  Postoperative Plan-     Informed Consent- Anesthetic plan and risks discussed with patient  I personally reviewed this patient with the CRNA  Discussed and agreed on the Anesthesia Plan with the CRNA  Paula Sport

## 2023-05-10 NOTE — PLAN OF CARE
Problem: PAIN - ADULT  Goal: Verbalizes/displays adequate comfort level or baseline comfort level  Description: Interventions:  - Encourage patient to monitor pain and request assistance  - Assess pain using appropriate pain scale  - Administer analgesics based on type and severity of pain and evaluate response  - Implement non-pharmacological measures as appropriate and evaluate response  - Consider cultural and social influences on pain and pain management  - Notify physician/advanced practitioner if interventions unsuccessful or patient reports new pain  Outcome: Progressing     Problem: INFECTION - ADULT  Goal: Absence or prevention of progression during hospitalization  Description: INTERVENTIONS:  - Assess and monitor for signs and symptoms of infection  - Monitor lab/diagnostic results  - Monitor all insertion sites, i e  indwelling lines, tubes, and drains  - Monitor endotracheal if appropriate and nasal secretions for changes in amount and color  - [place] appropriate cooling/warming therapies per order  - Administer medications as ordered  - Instruct and encourage patient and family to use good hand hygiene technique  - Identify and instruct in appropriate isolation precautions for identified infection/condition  Outcome: Progressing  Goal: Absence of fever/infection during neutropenic period  Description: INTERVENTIONS:  - Monitor WBC    Outcome: Progressing     Problem: SAFETY ADULT  Goal: Patient will remain free of falls  Description: INTERVENTIONS:  - Educate patient/family on patient safety including physical limitations  - Instruct patient to call for assistance with activity   - Consult OT/PT to assist with strengthening/mobility   - Keep Call bell within reach  - Keep bed low and locked with side rails adjusted as appropriate  - Keep care items and personal belongings within reach  - Initiate and maintain comfort rounds  - Make Fall Risk Sign visible to staff  - Offer Toileting every x Hours, in advance of need  - Initiate/Maintain xxxxalarm  - Obtain necessary fall risk management equipment: x  - Apply yellow socks and bracelet for high fall risk patients  - Consider moving patient to room near nurses station  Outcome: Progressing  Goal: Maintain or return to baseline ADL function  Description: INTERVENTIONS:  -  Assess patient's ability to carry out ADLs; assess patient's baseline for ADL function and identify physical deficits which impact ability to perform ADLs (bathing, care of mouth/teeth, toileting, grooming, dressing, etc )  - Assess/evaluate cause of self-care deficits   - Assess range of motion  - Assess patient's mobility; develop plan if impaired  - Assess patient's need for assistive devices and provide as appropriate  - Encourage maximum independence but intervene and supervise when necessary  - Involve family in performance of ADLs  - Assess for home care needs following discharge   - Consider OT consult to assist with ADL evaluation and planning for discharge  - Provide patient education as appropriate  Outcome: Progressing  Goal: Maintains/Returns to pre admission functional level  Description: INTERVENTIONS:  - Perform BMAT or MOVE assessment daily    - Set and communicate daily mobility goal to care team and patient/family/caregiver  - Collaborate with rehabilitation services on mobility goals if consulted  - Perform Range of Motion x times a day  - Reposition patient every x hours    - Dangle patient x times a day  - Stand patient x times a day  - Ambulate patient x times a day  - Out of bed to chair x times a day   - Out of bed for meals x times a day  - Out of bed for toileting  - Record patient progress and toleration of activity level   Outcome: Progressing     Problem: DISCHARGE PLANNING  Goal: Discharge to home or other facility with appropriate resources  Description: INTERVENTIONS:  - Identify barriers to discharge w/patient and caregiver  - Arrange for needed discharge resources and transportation as appropriate  - Identify discharge learning needs (meds, wound care, etc )  - Arrange for interpretive services to assist at discharge as needed  - Refer to Case Management Department for coordinating discharge planning if the patient needs post-hospital services based on physician/advanced practitioner order or complex needs related to functional status, cognitive ability, or social support system  Outcome: Progressing     Problem: Knowledge Deficit  Goal: Patient/family/caregiver demonstrates understanding of disease process, treatment plan, medications, and discharge instructions  Description: Complete learning assessment and assess knowledge base    Interventions:  - Provide teaching at level of understanding  - Provide teaching via preferred learning methods  Outcome: Progressing   x

## 2023-05-11 NOTE — QUICK NOTE
"Patient tolerated dinner well  Pain well-controlled  He states intense pain that brought him in has improved since surgery  He denies chest pain, shortness of breath, nausea, vomiting, increase in pain after eating, or other symptoms at this time  He is comfortable going home      /95   Pulse 80   Temp (!) 97 2 °F (36 2 °C)   Resp 12   Ht 5' 10\" (1 778 m)   Wt 114 kg (251 lb 12 3 oz)   SpO2 94%   BMI 36 12 kg/m²   Gen: NAD, resting in bed  Heart: RRR, no murmurs  Lungs: clear to auscultation bilaterally  Abdomen: soft, nontender, incision clean/dry/intact  Extremities: moving all independently , no edema    Patient may be discharged home  Ultram for pain if needed  Home instructions in AVS and work note provided  "

## 2023-05-12 ENCOUNTER — TELEPHONE (OUTPATIENT)
Dept: SURGERY | Facility: HOSPITAL | Age: 43
End: 2023-05-12

## 2023-05-12 NOTE — TELEPHONE ENCOUNTER
Post op call   Spoke to patient   Patient is doing well   No questions or concerns   Set up post op appointment   Reminded patient to call if there are any questions or concerns prior to post op appointment

## 2023-05-25 ENCOUNTER — OFFICE VISIT (OUTPATIENT)
Dept: SURGERY | Facility: HOSPITAL | Age: 43
End: 2023-05-25

## 2023-05-25 VITALS
WEIGHT: 254.6 LBS | SYSTOLIC BLOOD PRESSURE: 136 MMHG | DIASTOLIC BLOOD PRESSURE: 88 MMHG | TEMPERATURE: 97.3 F | HEART RATE: 83 BPM | HEIGHT: 70 IN | BODY MASS INDEX: 36.45 KG/M2 | RESPIRATION RATE: 16 BRPM

## 2023-05-25 DIAGNOSIS — Z09 POSTOP CHECK: Primary | ICD-10-CM

## 2023-05-25 PROCEDURE — 99024 POSTOP FOLLOW-UP VISIT: CPT | Performed by: SURGERY

## 2023-06-09 NOTE — PROGRESS NOTES
Seen and examined no acute events doing well   avss afebrile   Soft +BS ND NT incision cdi  S/p lap errol  Cont light duty for two weeks, f/u prn, path reviewed and benign

## 2024-01-18 ENCOUNTER — OFFICE VISIT (OUTPATIENT)
Dept: FAMILY MEDICINE CLINIC | Facility: CLINIC | Age: 44
End: 2024-01-18
Payer: COMMERCIAL

## 2024-01-18 VITALS
DIASTOLIC BLOOD PRESSURE: 86 MMHG | OXYGEN SATURATION: 96 % | SYSTOLIC BLOOD PRESSURE: 122 MMHG | WEIGHT: 269.2 LBS | HEIGHT: 70 IN | BODY MASS INDEX: 38.54 KG/M2 | HEART RATE: 76 BPM | TEMPERATURE: 97.8 F

## 2024-01-18 DIAGNOSIS — M17.11 PRIMARY OSTEOARTHRITIS OF RIGHT KNEE: ICD-10-CM

## 2024-01-18 DIAGNOSIS — Z13.0 SCREENING FOR DEFICIENCY ANEMIA: ICD-10-CM

## 2024-01-18 DIAGNOSIS — Z13.1 SCREENING FOR DIABETES MELLITUS: ICD-10-CM

## 2024-01-18 DIAGNOSIS — Z13.220 SCREENING FOR LIPID DISORDERS: ICD-10-CM

## 2024-01-18 DIAGNOSIS — Z00.00 ADULT GENERAL MEDICAL EXAMINATION: Primary | ICD-10-CM

## 2024-01-18 DIAGNOSIS — R06.83 SNORING: ICD-10-CM

## 2024-01-18 DIAGNOSIS — Z13.29 SCREENING FOR THYROID DISORDER: ICD-10-CM

## 2024-01-18 PROCEDURE — 99386 PREV VISIT NEW AGE 40-64: CPT

## 2024-01-18 NOTE — ASSESSMENT & PLAN NOTE
Patient with history of chronic right knee pain and had a knee replacement a few years ago completed by JOSE E. Patient states left knee is starting to give out. I recommended he see JOSE E for further management. I did offer to send him for an updated x-ray, which he will wait to see what JOSE E says. In the meantime, I recommended he wear a brace to give him some stability.

## 2024-01-18 NOTE — PROGRESS NOTES
ADULT ANNUAL PHYSICAL  [facility]    NAME: Tim Garza  AGE: 43 y.o. SEX: male  : 1980     DATE: 2024     Assessment and Plan:     Problem List Items Addressed This Visit       Osteoarthritis of right knee     Patient with history of chronic right knee pain and had a knee replacement a few years ago completed by JOSE E. Patient states left knee is starting to give out. I recommended he see [facility] for further management. I did offer to send him for an updated x-ray, which he will wait to see what JOSE E says. In the meantime, I recommended he wear a brace to give him some stability.         Adult general medical examination - Primary     Patient presents today to establish care with our practice. Patient has not been seen by a PCP in many years. Patient's medical history and medications were reviewed.     Preventative health needs were assessed and updated accordingly:  Flu - defers today   COVID - defers today   Tdap - last completed , due in     CRC - will be discussed at his next appointment    Screening labs ordered today - CBC, CMP, TSH, Lipid, and A1c; defer Hep C and HIV until a later date         BMI 38.0-38.9,adult     Patient with concerns of weight stagnation for the past year. He states he has changed his diet and has been eating very healthy and increased his exercise throughout the year, but there have been no changes in his weight. He states he is not gaining weight, however he is frustration with not losing weight despite making many lifestyle changes.     He expresses interest in pursuing medication management for his weight. Discussed with him I would be willing to prescribe a medication such as Wegovy, however insurance coverage and availability will be the main determinants. He has no history of thyroid cancer in his medical history of family history. Denies family history of MEN syndrome. Denies history of pancreatitis.      I also offered him a referral to weight management, however he feels he does not need referral right away as we will try to manage his weight here first.     Discussed with him that I would like to obtain lab work prior to starting these medications, which he is understanding of. I will see him for a follow up in 2 weeks with fasting labs prior to his visit, to discuss going on medication for weight loss.          Snoring     Patient with frequent awakenings during the night. Reports snoring and witnessed apneas. Recommended sleep study to be completed for evaluation of sleep apnea. Referral placed today, patient would prefer home study if insurance covers it.          Relevant Orders    Ambulatory Referral to Sleep Medicine     Other Visit Diagnoses       Screening for deficiency anemia        Relevant Orders    CBC and differential    Screening for lipid disorders        Relevant Orders    Lipid Panel with Direct LDL reflex    Screening for thyroid disorder        Relevant Orders    TSH, 3rd generation with Free T4 reflex    Screening for diabetes mellitus        Relevant Orders    Comprehensive metabolic panel    Hemoglobin A1C            Immunizations and preventive care screenings were discussed with patient today. Appropriate education was printed on patient's after visit summary.    Discussed risks and benefits of prostate cancer screening. We discussed the controversial history of PSA screening for prostate cancer in the [place] as well as the risk of over detection and over treatment of prostate cancer by way of PSA screening.  The patient understands that PSA blood testing is an imperfect way to screen for prostate cancer and that elevated PSA levels in the blood may also be caused by infection, inflammation, prostatic trauma or manipulation, urological procedures, or by benign prostatic enlargement.    The role of the digital rectal examination in prostate cancer screening was also discussed and  I discussed with him that there is large interobserver variability in the findings of digital rectal examination.    Counseling:  Alcohol/drug use: discussed moderation in alcohol intake, the recommendations for healthy alcohol use, and avoidance of illicit drug use.  Dental Health: discussed importance of regular tooth brushing, flossing, and dental visits.  Injury prevention: discussed safety/seat belts, safety helmets, smoke detectors, carbon dioxide detectors, and smoking near bedding or upholstery.  Sexual health: discussed sexually transmitted diseases, partner selection, use of condoms, avoidance of unintended pregnancy, and contraceptive alternatives.  Exercise: the importance of regular exercise/physical activity was discussed. Recommend exercise 3-5 times per week for at least 30 minutes.          Return in about 2 weeks (around 2/1/2024) for labs follow up.     Chief Complaint:     Chief Complaint   Patient presents with    Snoring    Sleeping Problem     Wakes a lot During the night    Knee Pain     Due to weight      History of Present Illness:     Adult Annual Physical   Patient here for a comprehensive physical exam. The patient reports problems - left knee pain starting to give out-takes tylenol for the pain; for about a year, he has been waking up more frequently at night; does snore; finds he needs to sleep on his side; if he lays on his back or drinking, he has witnessed apneic epsiodes  .    Diet and Physical Activity  Diet/Nutrition: well balanced diet, limited junk food, consuming 3-5 servings of fruits/vegetables daily, adequate fiber intake, and adequate whole grain intake.   Exercise: walking, moderate cardiovascular exercise, and 3-4 times a week on average.      Depression Screening  PHQ-2/9 Depression Screening    Little interest or pleasure in doing things: 0 - not at all  Feeling down, depressed, or hopeless: 0 - not at all  PHQ-2 Score: 0  PHQ-2 Interpretation: Negative depression  screen       General Health  Sleep: sleeps poorly, gets 4-6 hours of sleep on average, snores loudly, and witnessed apnea.   Hearing: normal - bilateral.  Vision: no vision problems, most recent eye exam <1 year ago, and wears contacts.   Dental: no dental visits for >1 year.        Health  Symptoms include: none    Advanced Care Planning  Do you have an advanced directive? no  Do you have a durable medical power of ? no     Review of Systems:     Review of Systems   Constitutional:  Negative for chills, fatigue and fever.   HENT:  Negative for ear pain, hearing loss and sore throat.    Respiratory:  Negative for cough and shortness of breath.    Cardiovascular:  Negative for chest pain, palpitations and leg swelling.   Gastrointestinal:  Negative for abdominal pain, blood in stool and vomiting.   Genitourinary:  Negative for difficulty urinating, dysuria and hematuria.   Musculoskeletal:  Positive for arthralgias (left knee pain). Negative for back pain and myalgias.   Skin:  Negative for color change and rash.   Neurological:  Negative for seizures, syncope and headaches.   Hematological:  Does not bruise/bleed easily.   Psychiatric/Behavioral:  Positive for sleep disturbance. Negative for behavioral problems and confusion. The patient is not nervous/anxious.    All other systems reviewed and are negative.     Past Medical History:     Past Medical History:   Diagnosis Date    Arthritis     R TKR today 3/2/2018    Chronic pain disorder     Contact lens overwear, bilateral     History of heart murmur in childhood       Past Surgical History:     Past Surgical History:   Procedure Laterality Date    ANTERIOR CRUCIATE LIGAMENT REPAIR Left     CHOLECYSTECTOMY LAPAROSCOPIC N/A 5/10/2023    Procedure: CHOLECYSTECTOMY LAPAROSCOPIC;  Surgeon: David Art MD;  Location:  MAIN OR;  Service: General    KNEE ARTHROSCOPY Right     KNEE SURGERY Right     with hardware    TOTAL KNEE ARTHROPLASTY Right 3/2/2018  "   Procedure: ARTHROPLASTY KNEE TOTAL,;  Surgeon: Adolph Kennedy MD;  Location: [facility] OR;  Service: Orthopedics      Family History:     History reviewed. No pertinent family history.   Social History:     Social History     Socioeconomic History    Marital status: /Civil Union     Spouse name: None    Number of children: None    Years of education: None    Highest education level: None   Occupational History    None   Tobacco Use    Smoking status: Never    Smokeless tobacco: Never   Vaping Use    Vaping status: Never Used   Substance and Sexual Activity    Alcohol use: Yes     Alcohol/week: 24.0 standard drinks of alcohol     Types: 24 Cans of beer per week     Comment: occasionally    Drug use: No    Sexual activity: Yes     Partners: Female   Other Topics Concern    None   Social History Narrative    None     Social Determinants of Health     Financial Resource Strain: Not on file   Food Insecurity: Not on file   Transportation Needs: Not on file   Physical Activity: Not on file   Stress: Not on file   Social Connections: Not on file   Intimate Partner Violence: Not on file   Housing Stability: Not on file      Current Medications:     No current outpatient medications on file.     No current facility-administered medications for this visit.      Allergies:     No Known Allergies   Physical Exam:     /86 (BP Location: Right arm, Patient Position: Sitting, Cuff Size: Large)   Pulse 76   Temp 97.8 °F (36.6 °C) (Temporal)   Ht 5' 10\" (1.778 m)   Wt 122 kg (269 lb 3.2 oz)   SpO2 96%   BMI 38.63 kg/m²     Physical Exam  Vitals and nursing note reviewed.   Constitutional:       General: He is not in acute distress.     Appearance: Normal appearance. He is well-developed. He is obese. He is not ill-appearing.   HENT:      Head: Normocephalic and atraumatic.      Right Ear: Tympanic membrane normal.      Left Ear: Tympanic membrane normal.      Mouth/Throat:      Mouth: Mucous membranes are moist.    "   Pharynx: Oropharynx is clear. No oropharyngeal exudate or posterior oropharyngeal erythema.   Cardiovascular:      Rate and Rhythm: Normal rate and regular rhythm.      Pulses: Normal pulses.      Heart sounds: No murmur heard.  Pulmonary:      Effort: Pulmonary effort is normal. No respiratory distress.      Breath sounds: Normal breath sounds.   Abdominal:      General: Bowel sounds are normal. There is no distension.      Palpations: Abdomen is soft.      Tenderness: There is no abdominal tenderness.   Musculoskeletal:         General: No swelling.      Cervical back: Neck supple.      Right lower leg: No edema.      Left lower leg: No edema.   Skin:     General: Skin is warm and dry.      Capillary Refill: Capillary refill takes less than 2 seconds.   Neurological:      Mental Status: He is alert and oriented to person, place, and time. Mental status is at baseline.   Psychiatric:         Mood and Affect: Mood normal.         Behavior: Behavior normal.          Anila Kimbrough PA-C  [facility], [place]

## 2024-01-18 NOTE — ASSESSMENT & PLAN NOTE
Patient presents today to establish care with our practice. Patient has not been seen by a PCP in many years. Patient's medical history and medications were reviewed.     Preventative health needs were assessed and updated accordingly:  Flu - defers today   COVID - defers today   Tdap - last completed 2018, due in 2028    CRC - will be discussed at his next appointment    Screening labs ordered today - CBC, CMP, TSH, Lipid, and A1c; defer Hep C and HIV until a later date

## 2024-01-18 NOTE — ASSESSMENT & PLAN NOTE
Patient with concerns of weight stagnation for the past year. He states he has changed his diet and has been eating very healthy and increased his exercise throughout the year, but there have been no changes in his weight. He states he is not gaining weight, however he is frustration with not losing weight despite making many lifestyle changes.     He expresses interest in pursuing medication management for his weight. Discussed with him I would be willing to prescribe a medication such as Wegovy, however insurance coverage and availability will be the main determinants. He has no history of thyroid cancer in his medical history of family history. Denies family history of MEN syndrome. Denies history of pancreatitis.     I also offered him a referral to weight management, however he feels he does not need referral right away as we will try to manage his weight here first.     Discussed with him that I would like to obtain lab work prior to starting these medications, which he is understanding of. I will see him for a follow up in 2 weeks with fasting labs prior to his visit, to discuss going on medication for weight loss.

## 2024-01-18 NOTE — ASSESSMENT & PLAN NOTE
Patient with frequent awakenings during the night. Reports snoring and witnessed apneas. Recommended sleep study to be completed for evaluation of sleep apnea. Referral placed today, patient would prefer home study if insurance covers it.

## 2024-01-18 NOTE — PROGRESS NOTES
"Name: Tim Garza      : 1980      MRN: [id number]  Encounter Provider: Anila Kimbrough PA-C  Encounter Date: 2024   Encounter department: [facility]    Assessment & Plan     {There are no diagnoses linked to this encounter. (Refresh or delete this SmartLink)}       Subjective      Patient presents today to establish care with our practice. He was last seen by a PCP in 2021.     Knee Pain       Review of Systems    Current Outpatient Medications on File Prior to Visit   Medication Sig    acetaminophen (TYLENOL) 325 mg tablet Take 2 tablets (650 mg total) by mouth every 6 (six) hours as needed for mild pain or moderate pain (Patient not taking: Reported on 2023)       Objective     /86 (BP Location: Right arm, Patient Position: Sitting, Cuff Size: Large)   Pulse 76   Temp 97.8 °F (36.6 °C) (Temporal)   Ht 5' 10\" (1.778 m)   Wt 122 kg (269 lb 3.2 oz)   SpO2 96%   BMI 38.63 kg/m²     Physical Exam  Anila Kimbrough PA-C    "

## 2024-01-19 ENCOUNTER — APPOINTMENT (OUTPATIENT)
Dept: LAB | Facility: CLINIC | Age: 44
End: 2024-01-19
Payer: COMMERCIAL

## 2024-01-19 DIAGNOSIS — Z13.29 SCREENING FOR THYROID DISORDER: ICD-10-CM

## 2024-01-19 DIAGNOSIS — Z13.0 SCREENING FOR DEFICIENCY ANEMIA: ICD-10-CM

## 2024-01-19 DIAGNOSIS — Z13.220 SCREENING FOR LIPID DISORDERS: ICD-10-CM

## 2024-01-19 DIAGNOSIS — Z13.1 SCREENING FOR DIABETES MELLITUS: ICD-10-CM

## 2024-01-19 LAB
ALBUMIN SERPL BCP-MCNC: 4.7 G/DL (ref 3.5–5)
ALP SERPL-CCNC: 68 U/L (ref 34–104)
ALT SERPL W P-5'-P-CCNC: 55 U/L (ref 7–52)
ANION GAP SERPL CALCULATED.3IONS-SCNC: 18 MMOL/L
AST SERPL W P-5'-P-CCNC: 32 U/L (ref 13–39)
BASOPHILS # BLD AUTO: 0.06 THOUSANDS/ÂΜL (ref 0–0.1)
BASOPHILS NFR BLD AUTO: 1 % (ref 0–1)
BILIRUB SERPL-MCNC: 0.48 MG/DL (ref 0.2–1)
BUN SERPL-MCNC: 13 MG/DL (ref 5–25)
CALCIUM SERPL-MCNC: 9.8 MG/DL (ref 8.4–10.2)
CHLORIDE SERPL-SCNC: 103 MMOL/L (ref 96–108)
CHOLEST SERPL-MCNC: 129 MG/DL
CO2 SERPL-SCNC: 20 MMOL/L (ref 21–32)
CREAT SERPL-MCNC: 0.85 MG/DL (ref 0.6–1.3)
EOSINOPHIL # BLD AUTO: 0.14 THOUSAND/ÂΜL (ref 0–0.61)
EOSINOPHIL NFR BLD AUTO: 3 % (ref 0–6)
ERYTHROCYTE [DISTWIDTH] IN BLOOD BY AUTOMATED COUNT: 13.2 % (ref 11.6–15.1)
EST. AVERAGE GLUCOSE BLD GHB EST-MCNC: 128 MG/DL
GFR SERPL CREATININE-BSD FRML MDRD: 105 ML/MIN/1.73SQ M
GLUCOSE P FAST SERPL-MCNC: 117 MG/DL (ref 65–99)
HBA1C MFR BLD: 6.1 %
HCT VFR BLD AUTO: 43.9 % (ref 36.5–49.3)
HDLC SERPL-MCNC: 24 MG/DL
HGB BLD-MCNC: 15.7 G/DL (ref 12–17)
IMM GRANULOCYTES # BLD AUTO: 0.02 THOUSAND/UL (ref 0–0.2)
IMM GRANULOCYTES NFR BLD AUTO: 1 % (ref 0–2)
LDLC SERPL DIRECT ASSAY-MCNC: 35 MG/DL (ref 0–100)
LYMPHOCYTES # BLD AUTO: 1.45 THOUSANDS/ÂΜL (ref 0.6–4.47)
LYMPHOCYTES NFR BLD AUTO: 33 % (ref 14–44)
MCH RBC QN AUTO: 30 PG (ref 26.8–34.3)
MCHC RBC AUTO-ENTMCNC: 35.8 G/DL (ref 31.4–37.4)
MCV RBC AUTO: 84 FL (ref 82–98)
MONOCYTES # BLD AUTO: 0.33 THOUSAND/ÂΜL (ref 0.17–1.22)
MONOCYTES NFR BLD AUTO: 8 % (ref 4–12)
NEUTROPHILS # BLD AUTO: 2.41 THOUSANDS/ÂΜL (ref 1.85–7.62)
NEUTS SEG NFR BLD AUTO: 54 % (ref 43–75)
NRBC BLD AUTO-RTO: 0 /100 WBCS
PLATELET # BLD AUTO: 226 THOUSANDS/UL (ref 149–390)
PMV BLD AUTO: 11.8 FL (ref 8.9–12.7)
POTASSIUM SERPL-SCNC: 4.3 MMOL/L (ref 3.5–5.3)
PROT SERPL-MCNC: 7.3 G/DL (ref 6.4–8.4)
RBC # BLD AUTO: 5.24 MILLION/UL (ref 3.88–5.62)
SODIUM SERPL-SCNC: 141 MMOL/L (ref 135–147)
TRIGL SERPL-MCNC: 513 MG/DL
TSH SERPL DL<=0.05 MIU/L-ACNC: 1.7 UIU/ML (ref 0.45–4.5)
WBC # BLD AUTO: 4.41 THOUSAND/UL (ref 4.31–10.16)

## 2024-01-19 PROCEDURE — 80053 COMPREHEN METABOLIC PANEL: CPT

## 2024-01-19 PROCEDURE — 84443 ASSAY THYROID STIM HORMONE: CPT

## 2024-01-19 PROCEDURE — 80061 LIPID PANEL: CPT

## 2024-01-19 PROCEDURE — 83721 ASSAY OF BLOOD LIPOPROTEIN: CPT

## 2024-01-19 PROCEDURE — 83036 HEMOGLOBIN GLYCOSYLATED A1C: CPT

## 2024-01-19 PROCEDURE — 36415 COLL VENOUS BLD VENIPUNCTURE: CPT

## 2024-01-19 PROCEDURE — 85025 COMPLETE CBC W/AUTO DIFF WBC: CPT

## 2024-01-22 DIAGNOSIS — G47.33 OSA (OBSTRUCTIVE SLEEP APNEA): Primary | ICD-10-CM

## 2024-02-01 ENCOUNTER — OFFICE VISIT (OUTPATIENT)
Dept: FAMILY MEDICINE CLINIC | Facility: CLINIC | Age: 44
End: 2024-02-01
Payer: COMMERCIAL

## 2024-02-01 VITALS
HEIGHT: 70 IN | BODY MASS INDEX: 38.82 KG/M2 | SYSTOLIC BLOOD PRESSURE: 118 MMHG | TEMPERATURE: 97.5 F | DIASTOLIC BLOOD PRESSURE: 60 MMHG | OXYGEN SATURATION: 97 % | WEIGHT: 271.2 LBS | HEART RATE: 82 BPM

## 2024-02-01 DIAGNOSIS — Z12.11 SCREENING FOR COLON CANCER: ICD-10-CM

## 2024-02-01 DIAGNOSIS — R73.03 PREDIABETES: ICD-10-CM

## 2024-02-01 PROBLEM — Z00.00 ADULT GENERAL MEDICAL EXAMINATION: Status: RESOLVED | Noted: 2024-01-18 | Resolved: 2024-02-01

## 2024-02-01 PROBLEM — E78.1 HYPERTRIGLYCERIDEMIA: Status: ACTIVE | Noted: 2024-02-01

## 2024-02-01 PROCEDURE — 99214 OFFICE O/P EST MOD 30 MIN: CPT

## 2024-02-01 NOTE — PROGRESS NOTES
Name: Tim Garza      : 1980      MRN: [id number]  Encounter Provider: Anila Kimbrough PA-C  Encounter Date: 2024   Encounter department: [facility]    Assessment & Plan     1. BMI 38.0-38.9,adult  Assessment & Plan:  Patient presents today to discuss his weight management options. Patient would like to proceed with medication management, as he has been trying diet and exercise changes for over 6 months at this point in time.     Discussed his options with him today, however I highly recommended a GLP-1 agonist due to his recent diagnosis of prediabetes. Last A1c 6.1% with fasting sugar of 117. Patient also has elevated triglycerides on lipid panel, over 500. He reports he has a history of having elevated triglycerides many years ago.     I recommended Wegovy, which he would like to proceed with. He reports [facility] in [place] has it in stock. However, if they do not have it in stock, I would like him to call around to pharmacies to find one that has the initial dose in stock.     He has no history of thyroid cancer in his medical history of family history. Denies family history of MEN syndrome. Denies history of pancreatitis.     His current weight goal is 225 lbs.  His current diet and exercise per HPI. Patient reports his current diet and exercise changes have not led to weight loss, which has caused frustration.     Discussed with patient we would like need to complete a prior auth for Wegovy due to insurance, however I feel this medication is medically necessary at this point due to his new onset prediabetes and weight stagnation. Prescribed initial dose and next dose to be started in one month.   Initial dose: 0.25mg Qweekly x 4 weeks  Next dose: 0.5mg Qweekly    Patient will follow up with me in one month to evaluate his progress on Wegovy. He was given instructions to avoid fatty foods and junks foods while on Wegovy, as this can cause severe nausea and vomiting. He  should also avoid large meals. Also recommended against excessive alcohol use, as this could cause nausea and vomiting as well. Given warning signs of pancreatitis including upper abdominal pain, nausea, and vomiting which warrant an ER visit if these occur.    Patient understands the treatment course and will let me know if he has any additional concerns in the meantime.    Orders:  -     Semaglutide-Weight Management (WEGOVY) 0.25 MG/0.5ML; Inject 0.5 mL (0.25 mg total) under the skin once a week for 28 days  -     Semaglutide-Weight Management (WEGOVY) 0.5 MG/0.5ML; Inject 0.5 mL (0.5 mg total) under the skin once a week for 28 days Do not start before February 27, 2024.    2. Prediabetes  -     Semaglutide-Weight Management (WEGOVY) 0.25 MG/0.5ML; Inject 0.5 mL (0.25 mg total) under the skin once a week for 28 days  -     Semaglutide-Weight Management (WEGOVY) 0.5 MG/0.5ML; Inject 0.5 mL (0.5 mg total) under the skin once a week for 28 days Do not start before February 27, 2024.    3. Screening for colon cancer  -     Ambulatory Referral to Gastroenterology; Future           Subjective      Patient presents today to discuss his weight management options. Patient reports he has struggled with his weight for many years, however he has made diet changes and exercise changes in the past 6 months that have not led to weight loss. Patient feels quite frustrated with his weight stagnation and would like to discuss medication management at this time.     Patient's goal weight is 225 lbs.    Current diet includes:  - Breakfast: typically egg-based breakfast or oats; meal prepped by his wife  - Lunch: typically salads with lean protein such as chicken  - Dinner: lean proteins, vegetables, and a starch   - Snacks: protein bars; lots of snacking at night and reports he tends to overeat his snacks    Exercise: 3-4x a week; cardio - 30 min treadmill with incline walk; bike - 15 minutes; minimal strength  "training          Review of Systems   Constitutional:  Positive for unexpected weight change. Negative for chills, fatigue and fever.   Respiratory:  Negative for cough, chest tightness and shortness of breath.    Cardiovascular:  Negative for chest pain, palpitations and leg swelling.   Gastrointestinal:  Negative for abdominal pain, constipation, diarrhea, nausea and vomiting.   Neurological:  Negative for dizziness, light-headedness and headaches.       No current outpatient medications on file prior to visit.       Objective     /60 (BP Location: Left arm, Patient Position: Sitting, Cuff Size: Adult)   Pulse 82   Temp 97.5 °F (36.4 °C)   Ht 5' 10\" (1.778 m)   Wt 123 kg (271 lb 3.2 oz)   SpO2 97%   BMI 38.91 kg/m²     Physical Exam  Vitals and nursing note reviewed.   Constitutional:       General: He is not in acute distress.     Appearance: Normal appearance. He is not ill-appearing or diaphoretic.   HENT:      Head: Normocephalic and atraumatic.   Cardiovascular:      Rate and Rhythm: Normal rate and regular rhythm.      Heart sounds: No murmur heard.  Pulmonary:      Effort: Pulmonary effort is normal. No respiratory distress.      Breath sounds: Normal breath sounds.   Abdominal:      General: Bowel sounds are normal.      Palpations: Abdomen is soft.      Tenderness: There is no abdominal tenderness.   Musculoskeletal:      Right lower leg: No edema.      Left lower leg: No edema.   Skin:     Coloration: Skin is not cyanotic or pale.   Neurological:      General: No focal deficit present.      Mental Status: He is alert and oriented to person, place, and time.   Psychiatric:         Mood and Affect: Mood normal.         Behavior: Behavior normal. Behavior is cooperative.         Judgment: Judgment normal.       Anila Kimbrough PA-C    "

## 2024-02-01 NOTE — ASSESSMENT & PLAN NOTE
Patient presents today to discuss his weight management options. Patient would like to proceed with medication management, as he has been trying diet and exercise changes for over 6 months at this point in time.     Discussed his options with him today, however I highly recommended a GLP-1 agonist due to his recent diagnosis of prediabetes. Last A1c 6.1% with fasting sugar of 117. Patient also has elevated triglycerides on lipid panel, over 500. He reports he has a history of having elevated triglycerides many years ago.     I recommended Wegovy, which he would like to proceed with. He reports [facility] in [place] has it in stock. However, if they do not have it in stock, I would like him to call around to pharmacies to find one that has the initial dose in stock.     He has no history of thyroid cancer in his medical history of family history. Denies family history of MEN syndrome. Denies history of pancreatitis.     His current weight goal is 225 lbs.  His current diet and exercise per HPI. Patient reports his current diet and exercise changes have not led to weight loss, which has caused frustration.     Discussed with patient we would like need to complete a prior auth for Wegovy due to insurance, however I feel this medication is medically necessary at this point due to his new onset prediabetes and weight stagnation. Prescribed initial dose and next dose to be started in one month.   Initial dose: 0.25mg Qweekly x 4 weeks  Next dose: 0.5mg Qweekly    Patient will follow up with me in one month to evaluate his progress on Wegovy. He was given instructions to avoid fatty foods and junks foods while on Wegovy, as this can cause severe nausea and vomiting. He should also avoid large meals. Also recommended against excessive alcohol use, as this could cause nausea and vomiting as well. Given warning signs of pancreatitis including upper abdominal pain, nausea, and vomiting which warrant an ER visit if these  occur.    Patient understands the treatment course and will let me know if he has any additional concerns in the meantime.

## 2024-02-06 ENCOUNTER — PREP FOR PROCEDURE (OUTPATIENT)
Age: 44
End: 2024-02-06

## 2024-02-06 ENCOUNTER — TELEPHONE (OUTPATIENT)
Age: 44
End: 2024-02-06

## 2024-02-06 DIAGNOSIS — Z12.11 SCREENING FOR COLON CANCER: Primary | ICD-10-CM

## 2024-02-06 NOTE — TELEPHONE ENCOUNTER
02/06/24  Screened by: Sharee Yates    Referring Provider Luis E    Pre- Screening:     There is no height or weight on file to calculate BMI.  Has patient been referred for a routine screening Colonoscopy? yes  Is the patient between 45-75 years old? yes      Previous Colonoscopy no   If yes:    Date:     Facility:     Reason:         Does the patient want to see a Gastroenterologist prior to their procedure OR are they having any GI symptoms? no    Has the patient been hospitalized or had abdominal surgery in the past 6 months? no    Does the patient use supplemental oxygen? no    Does the patient take Coumadin, Lovenox, Plavix, Elliquis, Xarelto, or other blood thinning medication? no    Has the patient had a stroke, cardiac event, or stent placed in the past year? no        If patient is between 45yrs - 49yrs, please advise patient that we will have to confirm benefits & coverage with their insurance company for a routine screening colonoscopy.

## 2024-02-06 NOTE — TELEPHONE ENCOUNTER
Scheduled date of colonoscopy (as of today):3/18/24  Physician performing colonoscopy:Ohm  Location of colonoscopy:[facility]  Bowel prep reviewed with patient:ALFREDO/Giacomo sent to [facility]  Instructions reviewed with patient by:BRENT  Clearances: N/A    : Sharee Garza [phone]

## 2024-02-09 ENCOUNTER — PATIENT MESSAGE (OUTPATIENT)
Dept: FAMILY MEDICINE CLINIC | Facility: CLINIC | Age: 44
End: 2024-02-09

## 2024-02-13 ENCOUNTER — TELEPHONE (OUTPATIENT)
Age: 44
End: 2024-02-13

## 2024-02-13 NOTE — TELEPHONE ENCOUNTER
Felix yang called that a wegovy script is at the pharm on file but needs a prior auth before it can be refilled.

## 2024-02-13 NOTE — TELEPHONE ENCOUNTER
PA for WEGOVY 0.25 MG/0.5ML     Submitted via  [x]CMM-KEY [id number]  []Surescripts-Case ID #   []Faxed to plan   []Other website   []Phone call Case ID #     Office notes sent, clinical questions answered. Awaiting determination      Turnaround time for [facility] to make a determination on commercial Prior Authorizations can be up to 15 days. If you have further questions, please contact your insurance company for an update. Thank you

## 2024-02-15 NOTE — PATIENT COMMUNICATION
Spoke with Kerwin Clinical Review @ [facility] Wegovy is in pending review will has results by end of day tmw 2/16/24 faxed to

## 2024-02-16 NOTE — TELEPHONE ENCOUNTER
PA for WEGOVY 0.25 MG/0.5ML  Approved   Date(s) approved 12/16/23-8/15/24  Case #[id number]    Patient advised by [x] [employer] Message                      [] Phone call       Pharmacy advised by [x]Fax                                     []Phone call    Approval letter scanned into Media Yes

## 2024-02-21 ENCOUNTER — HOSPITAL ENCOUNTER (OUTPATIENT)
Dept: SLEEP CENTER | Facility: CLINIC | Age: 44
Discharge: HOME | End: 2024-02-21

## 2024-02-21 DIAGNOSIS — G47.33 OSA (OBSTRUCTIVE SLEEP APNEA): ICD-10-CM

## 2024-02-22 NOTE — PROGRESS NOTES
Home Sleep Study Documentation    HOME STUDY DEVICE: Noxturnal yes                                           Mita G3 no      Pre-Sleep Home Study:    Set-up and instructions performed by: Nguyen    Technician performed demonstration for Patient: yes    Return demonstration performed by Patient: yes    Written instructions provided to Patient: yes    Patient signed consent form: yes        Post-Sleep Home Study:    Additional comments by Patient: pending    Home Sleep Study Failed:pending    Failure reason: pending    Reported or Detected: pending    Scored by: pending

## 2024-02-26 ENCOUNTER — RA CDI HCC (OUTPATIENT)
Dept: OTHER | Facility: HOSPITAL | Age: 44
End: 2024-02-26

## 2024-03-05 ENCOUNTER — TELEPHONE (OUTPATIENT)
Dept: SLEEP CENTER | Facility: CLINIC | Age: 44
End: 2024-03-05

## 2024-03-05 PROBLEM — G47.33 OSA (OBSTRUCTIVE SLEEP APNEA): Status: ACTIVE | Noted: 2024-03-05

## 2024-03-05 NOTE — TELEPHONE ENCOUNTER
Patient left message on the nurse line inquiring about the home sleep study results.    Returned call from patient.  Advised sleep study has yet to be resulted and once resulted and the provider makes recommendations, one of the nurse would be in touch.  Patient verbalized understanding.

## 2024-04-02 ENCOUNTER — TELEPHONE (OUTPATIENT)
Age: 44
End: 2024-04-02

## 2024-04-02 DIAGNOSIS — R73.03 PREDIABETES: ICD-10-CM

## 2024-04-02 NOTE — TELEPHONE ENCOUNTER
Pt recently got sent wegovy 0.25mg to [facility] in [place] but they are out of stock and he would like this sent to [facility] pharmacy([phone]) since they are in stock. Please advise, thank you

## 2024-04-09 DIAGNOSIS — R73.03 PREDIABETES: ICD-10-CM

## 2024-04-09 DIAGNOSIS — E78.1 HYPERTRIGLYCERIDEMIA: ICD-10-CM

## 2024-04-17 ENCOUNTER — TELEPHONE (OUTPATIENT)
Age: 44
End: 2024-04-17

## 2024-04-23 NOTE — TELEPHONE ENCOUNTER
PA for Wegovy 0.5mg    Submitted via    [x]CMM-KEY [id number]  [][employer]-Case ID #   []Faxed to plan   []Other website  []Phone call Case ID #     Office notes sent, clinical questions answered. Awaiting determination    Turnaround time for your insurance to make a decision on your Prior Authorization can take 7-21 business days.

## 2024-04-23 NOTE — PROGRESS NOTES
Sleep Medicine Outpatient Note   Tim Garza 43 y.o. male MRN: [id number]  4/24/2024      Referring Physician: Anila Kimbrough PA-C    Reason for Consultation:    Chief Complaint   Patient presents with    Sleep Apnea       Assessment/Plan:    1. ANANTH (obstructive sleep apnea)  Assessment & Plan:  We discussed his home sleep study that showed SERAFIN 16.1 but with 69 minutes of SpO2 below 89%.  Primarily the events of hypoxia occurred during supine sleep.  In the lateral position he has no significant hypoxia.      Mostly supine, supine SERAFIN 17, lateral SERAFIN 10.  We discussed pathophysiology of obstructive sleep apnea, potential long-term cardiovascular effects of untreated obstructive sleep apnea.  We also discussed treatment options including CPAP, oral appliance therapy, positional therapy, weight loss, hypoglossal nerve stimulation.  We discussed the pros and cons of each method    At this time he does not feel like he could tolerate CPAP.  His sleep quality subjectively is good.  I advised using positional therapy in the meantime.  However, he is currently on Wegovy and I do believe some further weight loss will decrease the burden of obstructive sleep apnea.    It is cited in the literature that 10% weight loss can reduce sleep apnea by up to 30%.  I do believe that he should be reevaluated if he can achieve 10-20% of weight loss compared to original weight.    Will follow-up in 6 months.  If he is able to achieve enough weight loss, we should repeat home sleep study.  I did caution him that residual sleep apnea may occur due to enlarged uvula which would not change with weight loss          Health Maintenance  Immunization History   Administered Date(s) Administered    COVID-19 J&J (Fineline) vaccine 0.5 mL 03/15/2021    COVID-19 PFIZER VACCINE 0.3 ML IM 11/07/2021    DTP 02/10/1979, 03/13/1979, 04/02/1980, 05/14/1982    Hep B, Adolescent or Pediatric 1980, 03/19/1979    INFLUENZA 11/19/2014, 11/26/2020,  11/26/2020, 10/24/2021, 10/24/2021, 10/26/2022, 11/24/2023    Influenza, seasonal, injectable 11/19/2014    MMR 02/02/1980, 09/14/1982    Tdap 02/21/2018    Tuberculin Skin Test-PPD Intradermal 06/21/2022        Return in about 6 months (around 10/24/2024).    History of Present Illness   HPI:  Tim Garza is a 43 y.o. male who has a past medical history of obesity, moderate ANANTH who is presenting for evaluation of ANANTH    He underwent home sleep study on 3/5/2024 ordered by his PCP for snoring that showed moderate ANANTH with respiratory event index of 16.1.  69.2 minutes with saturations below 90%.  He is here to discuss results.    He reports snoring, breathing pauses during sleep.  He usually goes to sleep 10-11 PM to 6-7 AM.   He feels good upon awakening.  If he lays on his back he will have breathing issues.  He usually sleeps on his side.  He usually wakes up 3-4 times per night without knowing why.  He usually uses an alarm to wake up.      However he does not report any daytime impairment.  He has no excessive daytime sleepiness.  Salley is 3.  He does not need to take a nap and does not doze off during sedentary activity.    He drinks 9-15 ounces of coffee per day.  He denies any restless legs.  He denies any parasomnias.    Historical Information   Past Medical History:   Diagnosis Date    Arthritis     R TKR today 3/2/2018    Chronic pain disorder     Contact lens overwear, bilateral     History of heart murmur in childhood      Past Surgical History:   Procedure Laterality Date    ANTERIOR CRUCIATE LIGAMENT REPAIR Left     CHOLECYSTECTOMY LAPAROSCOPIC N/A 5/10/2023    Procedure: CHOLECYSTECTOMY LAPAROSCOPIC;  Surgeon: David Art MD;  Location:  MAIN OR;  Service: General    KNEE ARTHROSCOPY Right     KNEE SURGERY Right     with hardware    TOTAL KNEE ARTHROPLASTY Right 3/2/2018    Procedure: ARTHROPLASTY KNEE TOTAL,;  Surgeon: Adolph Kennedy MD;  Location: [facility];  Service: Orthopedics  "    No family history on file.      Meds/Allergies     Current Outpatient Medications:     Semaglutide-Weight Management (WEGOVY) 0.25 MG/0.5ML, Inject 0.5 mL (0.25 mg total) under the skin once a week For 4 weeks. If tolerating, call for next dosage, Disp: 2 mL, Rfl: 0    [START ON 5/5/2024] Semaglutide-Weight Management (WEGOVY) 0.5 MG/0.5ML, Inject 0.5 mL (0.5 mg total) under the skin once a week for 28 days Do not start before May 5, 2024., Disp: 2 mL, Rfl: 0    [START ON 6/2/2024] Semaglutide-Weight Management (WEGOVY) 1 MG/0.5ML, Inject 0.5 mL (1 mg total) under the skin once a week for 28 days Do not start before June 2, 2024., Disp: 2 mL, Rfl: 0    [START ON 6/30/2024] Semaglutide-Weight Management (WEGOVY) 1.7 MG/0.75ML, Inject 0.75 mL (1.7 mg total) under the skin once a week for 28 days Do not start before June 30, 2024., Disp: 3 mL, Rfl: 0    [START ON 7/28/2024] Semaglutide-Weight Management (WEGOVY) 2.4 MG/0.75ML, Inject 0.75 mL (2.4 mg total) under the skin once a week for 28 days Do not start before July 28, 2024., Disp: 3 mL, Rfl: 0  No Known Allergies    Vitals: Blood pressure 133/83, pulse 81, height 5' 10\" (1.778 m), weight 123 kg (271 lb). Body mass index is 38.88 kg/m².        Physical Exam  Vitals and nursing note reviewed.   Constitutional:       General: He is not in acute distress.     Appearance: Normal appearance. He is well-developed. He is not ill-appearing, toxic-appearing or diaphoretic.   HENT:      Head: Normocephalic and atraumatic.      Mouth/Throat:      Mouth: Mucous membranes are moist.      Pharynx: Oropharynx is clear. No oropharyngeal exudate.      Comments: Enlarged uvula, Mallampati 3  Eyes:      Conjunctiva/sclera: Conjunctivae normal.   Cardiovascular:      Rate and Rhythm: Normal rate.   Pulmonary:      Effort: Pulmonary effort is normal. No respiratory distress.      Breath sounds: No stridor.   Abdominal:      Tenderness: There is no guarding.   Musculoskeletal:      " "Cervical back: Normal range of motion and neck supple. No rigidity.   Skin:     General: Skin is warm and dry.   Neurological:      General: No focal deficit present.      Mental Status: He is alert and oriented to person, place, and time. Mental status is at baseline.   Psychiatric:         Mood and Affect: Mood normal.       Neck Circumference: 19     Labs:   I have personally reviewed pertinent lab results.    ABG: No results found for: \"PHART\", \"RWH9AFF\", \"PO2ART\", \"GAH1IDL\", \"I5IQXYKT\", \"BEART\", \"SOURCE\",   BNP: No results found for: \"BNP\",   CBC:  Lab Results   Component Value Date    WBC 4.41 01/19/2024    HGB 15.7 01/19/2024    HCT 43.9 01/19/2024    MCV 84 01/19/2024     01/19/2024    EOSPCT 3 01/19/2024    EOSABS 0.14 01/19/2024    NEUTOPHILPCT 54 01/19/2024    LYMPHOPCT 33 01/19/2024   ,   CMP:   Lab Results   Component Value Date    SODIUM 141 01/19/2024    K 4.3 01/19/2024     01/19/2024    CO2 20 (L) 01/19/2024    BUN 13 01/19/2024    CREATININE 0.85 01/19/2024    CALCIUM 9.8 01/19/2024    AST 32 01/19/2024    ALT 55 (H) 01/19/2024    ALKPHOS 68 01/19/2024    EGFR 105 01/19/2024   ,   PT/INR:   Lab Results   Component Value Date    INR 0.98 02/06/2018   ,   Ferrtin: No components found for: \"FERRTIN\",  Magensium: No results found for: \"MAGNESIUM\",      Imaging and other studies: I have personally reviewed pertinent reports.   and I have personally reviewed pertinent films in PACS      Sleep Study:  3/5/2024 HST  SERAFIN 16.1  69 minutes with SpO2<90%                                 Oxygen Saturation (SpO2) Total Supine Non-Supine Duration       Oxygen Desaturation Index (CODI): 16.6 /h 18.2 /h 10.7 /h           Average SpO2: 91.8 % 91.2 % 94 %           Minimum SpO2: 81 % 81 % 89 %           SpO2 Duration < 90% 16.5 % 21 % 0.1 % 69.2 m       SpO2 Duration ? 88% 6 % 7.7 % 0 % 25.3 m       SpO2 Duration < 85% 0.1 % 0.1 % 0 % 0.3 m       Average Desat Drop: 5.5 % 5.6 % 4.8 %            Glen" "MD Santiago  Pulmonary, Critical Care and Sleep Medicine  [facility]     Portions of the record may have been created with voice recognition software. Occasional wrong word or \"sound a like\" substitutions may have occurred due to the inherent limitations of voice recognition software. Please read the chart carefully and recognize, using context, where substitutions have occurred.     "

## 2024-04-24 ENCOUNTER — OFFICE VISIT (OUTPATIENT)
Dept: SLEEP CENTER | Facility: CLINIC | Age: 44
End: 2024-04-24
Payer: COMMERCIAL

## 2024-04-24 VITALS
DIASTOLIC BLOOD PRESSURE: 83 MMHG | SYSTOLIC BLOOD PRESSURE: 133 MMHG | HEART RATE: 81 BPM | WEIGHT: 271 LBS | HEIGHT: 70 IN | BODY MASS INDEX: 38.8 KG/M2

## 2024-04-24 DIAGNOSIS — G47.33 OSA (OBSTRUCTIVE SLEEP APNEA): Primary | ICD-10-CM

## 2024-04-24 PROCEDURE — 99203 OFFICE O/P NEW LOW 30 MIN: CPT | Performed by: INTERNAL MEDICINE

## 2024-04-24 NOTE — ASSESSMENT & PLAN NOTE
We discussed his home sleep study that showed SERAFIN 16.1 but with 69 minutes of SpO2 below 89%.  Primarily the events of hypoxia occurred during supine sleep.  In the lateral position he has no significant hypoxia.      Mostly supine, supine SERAFIN 17, lateral SERAFIN 10.  We discussed pathophysiology of obstructive sleep apnea, potential long-term cardiovascular effects of untreated obstructive sleep apnea.  We also discussed treatment options including CPAP, oral appliance therapy, positional therapy, weight loss, hypoglossal nerve stimulation.  We discussed the pros and cons of each method    At this time he does not feel like he could tolerate CPAP.  His sleep quality subjectively is good.  I advised using positional therapy in the meantime.  However, he is currently on Wegovy and I do believe some further weight loss will decrease the burden of obstructive sleep apnea.    It is cited in the literature that 10% weight loss can reduce sleep apnea by up to 30%.  I do believe that he should be reevaluated if he can achieve 10-20% of weight loss compared to original weight.    Will follow-up in 6 months.  If he is able to achieve enough weight loss, we should repeat home sleep study.  I did caution him that residual sleep apnea may occur due to enlarged uvula which would not change with weight loss

## 2024-04-25 NOTE — TELEPHONE ENCOUNTER
PA for Wegovy 0.5  Approved     Date(s) approved 0416/2024-5/1/2024  Case #    Patient advised by [x] [employer] Message                      [] Phone call      []LMOM     []L/M to call office as no active Communication consent on file     []Unable to leave detailed message as VM not approved on Communication consent         Pharmacy advised by [x]Fax                                     []Phone call    Approval letter scanned into Media Yes

## 2024-05-15 ENCOUNTER — PATIENT MESSAGE (OUTPATIENT)
Dept: FAMILY MEDICINE CLINIC | Facility: CLINIC | Age: 44
End: 2024-05-15

## 2024-05-16 DIAGNOSIS — E78.1 HYPERTRIGLYCERIDEMIA: ICD-10-CM

## 2024-05-16 DIAGNOSIS — R73.03 PREDIABETES: ICD-10-CM

## 2024-05-24 ENCOUNTER — OFFICE VISIT (OUTPATIENT)
Dept: FAMILY MEDICINE CLINIC | Facility: CLINIC | Age: 44
End: 2024-05-24
Payer: COMMERCIAL

## 2024-05-24 VITALS
DIASTOLIC BLOOD PRESSURE: 80 MMHG | TEMPERATURE: 97.9 F | SYSTOLIC BLOOD PRESSURE: 130 MMHG | OXYGEN SATURATION: 98 % | WEIGHT: 259 LBS | BODY MASS INDEX: 37.16 KG/M2 | HEART RATE: 86 BPM

## 2024-05-24 DIAGNOSIS — R73.03 PREDIABETES: ICD-10-CM

## 2024-05-24 DIAGNOSIS — E78.1 HYPERTRIGLYCERIDEMIA: ICD-10-CM

## 2024-05-24 PROCEDURE — 99214 OFFICE O/P EST MOD 30 MIN: CPT

## 2024-05-24 NOTE — ASSESSMENT & PLAN NOTE
Patient presents today for weight management follow-up.  He has lost about 20 pounds according to his scale.  He has been doing well with injections and has had no side effects from the Wegovy.  He has had no availability issues with the Wegovy.  He is hitting a weight stagnation, so I am recommending increasing to the 1 mg dose.  It was already sent through the pharmacy, so a prior authorization is pending.  He does not have any pens left, so I did send in a refill of the 0.5 mg dose for him to use in the meantime until 1 mg dose is approved.    I would like to see him for follow-up in another 3 months with fasting lab work completed prior.  Will obtain CBC, CMP and lipid panel to check his triglyceride level.  ER precautions again given for severe abdominal pain, nausea and vomiting.    Patient to continue his diet and exercise lifestyle changes as we discussed.  He is agreeable with follow-up plan and will let me know if there are any concerns in the meantime.

## 2024-05-24 NOTE — PROGRESS NOTES
Ambulatory Visit  Name: Tim Garza      : 1980      MRN: [id number]  Encounter Provider: Anila Kimbrough PA-C  Encounter Date: 2024   Encounter department: [facility]    Assessment & Plan   1. BMI 38.0-38.9,adult  Assessment & Plan:  Patient presents today for weight management follow-up.  He has lost about 20 pounds according to his scale.  He has been doing well with injections and has had no side effects from the Wegovy.  He has had no availability issues with the Wegovy.  He is hitting a weight stagnation, so I am recommending increasing to the 1 mg dose.  It was already sent through the pharmacy, so a prior authorization is pending.  He does not have any pens left, so I did send in a refill of the 0.5 mg dose for him to use in the meantime until 1 mg dose is approved.    I would like to see him for follow-up in another 3 months with fasting lab work completed prior.  Will obtain CBC, CMP and lipid panel to check his triglyceride level.  ER precautions again given for severe abdominal pain, nausea and vomiting.    Patient to continue his diet and exercise lifestyle changes as we discussed.  He is agreeable with follow-up plan and will let me know if there are any concerns in the meantime.  Orders:  -     Semaglutide-Weight Management (WEGOVY) 0.5 MG/0.5ML; Inject 0.5 mL (0.5 mg total) under the skin once a week for 28 days  2. Prediabetes  -     Semaglutide-Weight Management (WEGOVY) 0.5 MG/0.5ML; Inject 0.5 mL (0.5 mg total) under the skin once a week for 28 days  -     CBC and differential; Future; Expected date: 2024  -     Comprehensive metabolic panel; Future; Expected date: 2024  3. Hypertriglyceridemia  -     Semaglutide-Weight Management (WEGOVY) 0.5 MG/0.5ML; Inject 0.5 mL (0.5 mg total) under the skin once a week for 28 days  -     Lipid Panel with Direct LDL reflex; Future; Expected date: 2024       History of Present Illness     Patient  presents today for weight management follow-up.  He was recently started on Wegovy and is currently on the 0.5 mg dose.  He has been doing well with the medication and has had no side effects.  He injects himself once a week and has no injection site reactions.  According to his scale he has lost about 20 pounds in about 2 months.  However, for the past couple weeks he has been noticing a stagnation of his weight.  He is noticing that he does not reach for snacks as often as he once was.  He is also exercising more and trying to eat healthy.  He has no new concerns today.        Review of Systems   Constitutional:  Negative for chills, fatigue and fever.   Respiratory:  Negative for cough, chest tightness and shortness of breath.    Cardiovascular:  Negative for chest pain, palpitations and leg swelling.   Gastrointestinal:  Negative for abdominal pain, constipation, diarrhea, nausea and vomiting.   Neurological:  Negative for dizziness, light-headedness and headaches.       Objective     /80   Pulse 86   Temp 97.9 °F (36.6 °C) (Temporal)   Wt 117 kg (259 lb)   SpO2 98%   BMI 37.16 kg/m²     Physical Exam  Vitals and nursing note reviewed.   Constitutional:       General: He is not in acute distress.     Appearance: Normal appearance. He is normal weight. He is not ill-appearing.   HENT:      Head: Normocephalic and atraumatic.   Cardiovascular:      Rate and Rhythm: Normal rate and regular rhythm.      Pulses: Normal pulses.      Heart sounds: No murmur heard.  Pulmonary:      Effort: Pulmonary effort is normal. No respiratory distress.      Breath sounds: Normal breath sounds.   Neurological:      General: No focal deficit present.      Mental Status: He is alert and oriented to person, place, and time. Mental status is at baseline.   Psychiatric:         Mood and Affect: Mood normal.         Behavior: Behavior normal.         Judgment: Judgment normal.       Administrative Statements     Anila Kimbrough,  MIRANDA  [facility]

## 2024-05-29 DIAGNOSIS — R73.03 PREDIABETES: ICD-10-CM

## 2024-05-29 DIAGNOSIS — E78.1 HYPERTRIGLYCERIDEMIA: ICD-10-CM

## 2024-06-06 ENCOUNTER — TELEPHONE (OUTPATIENT)
Age: 44
End: 2024-06-06

## 2024-06-06 NOTE — TELEPHONE ENCOUNTER
PA for Wegovy 1mg    Submitted via    [x]CMM-KEY [id number]  [][employer]-Case ID #   []Faxed to plan   []Other website   []Phone call Case ID #     Office notes sent, clinical questions answered. Awaiting determination    Turnaround time for your insurance to make a decision on your Prior Authorization can take 7-21 business days.

## 2024-06-06 NOTE — TELEPHONE ENCOUNTER
PA for Wegovy 1.7mg    Submitted via    [x]CMM-KEY [id number]  [][employer]-Case ID #   []Faxed to plan   []Other website   []Phone call Case ID #     Office notes sent, clinical questions answered. Awaiting determination    Turnaround time for your insurance to make a decision on your Prior Authorization can take 7-21 business days.

## 2024-06-07 NOTE — TELEPHONE ENCOUNTER
PA for WEGOVY 1.7 Approved     Date(s) approved 12/16/23-8/15/24    Case #[id number]    Patient advised by          [] [employer] Message  [] Phone call   [x]LMOM  []L/M to call office as no active Communication consent on file  []Unable to leave detailed message as VM not approved on Communication consent       Pharmacy advised by    [x]Fax  []Phone call    Approval letter scanned into Media Yes

## 2024-06-20 ENCOUNTER — TELEPHONE (OUTPATIENT)
Dept: FAMILY MEDICINE CLINIC | Facility: CLINIC | Age: 44
End: 2024-06-20

## 2024-08-15 ENCOUNTER — TELEPHONE (OUTPATIENT)
Dept: FAMILY MEDICINE CLINIC | Facility: CLINIC | Age: 44
End: 2024-08-15

## 2024-08-15 DIAGNOSIS — R73.03 PREDIABETES: ICD-10-CM

## 2024-08-15 DIAGNOSIS — E78.1 HYPERTRIGLYCERIDEMIA: ICD-10-CM

## 2024-08-15 NOTE — TELEPHONE ENCOUNTER
PA for Semaglutide-Weight Management (WEGOVY) 2.4 MG/0.75MLSUBMITTED     via    [x]CMM-KEY: [id number]  [][employer]-Case ID #   []Faxed to plan   []Other website   [x]Phone call Case ID # [id number]  [phone], spoke with Jeff.    Office notes sent, clinical questions answered. Awaiting determination    Turnaround time for your insurance to make a decision on your Prior Authorization can take 7-21 business days.

## 2024-08-15 NOTE — TELEPHONE ENCOUNTER
Reason for call: prior auth Semaglutide-Weight Management (WEGOVY)    [] Refill   [x] Prior Auth  [] Other:     Office: [facility]  [x] PCP/Provider - Anila Kimbrough   [] Specialty/Provider -     Medication: Semaglutide-Weight Management (WEGOVY)      Dose/Frequency: 2.4 MG/0.75ML/ Inject 0.75 mL (2.4 mg total) under the skin once a week     Quantity: 30 day supply     Pharmacy: [facility] [place]     Does the patient have enough for 3 days?   [] Yes   [x] No - Send as HP to POD

## 2024-08-16 NOTE — TELEPHONE ENCOUNTER
Pt called back in regards of voicemail he received from office. Pt was informed of message on his chart regarding information needed to process his PA. Patient stated his last weight taken this past Tuesday 8/13/2024 was 249.2 Lb.

## 2024-08-19 NOTE — TELEPHONE ENCOUNTER
Patient called in asking if he can double up on his previous WEGOVY prescription until his PA  for Wegoby 2.4 MG is approved. As per Patient he has no medication left for tomorrow. Please review and reach out to Patient if is possible.

## 2024-08-20 ENCOUNTER — TELEPHONE (OUTPATIENT)
Age: 44
End: 2024-08-20

## 2024-08-20 ENCOUNTER — PATIENT MESSAGE (OUTPATIENT)
Dept: FAMILY MEDICINE CLINIC | Facility: CLINIC | Age: 44
End: 2024-08-20

## 2024-08-20 NOTE — TELEPHONE ENCOUNTER
"Pt called refill line stating that after he was told the Wegovy 1.7mg was called into [facility] he received a message from [facility] that his script was ready. He thought maybe we messed up on pharmacy and went and picked it up. When he got home he realized [facility] gave him 0.25mg pens and he called to see what he should do he did not open the box or break the seal and it has been refrigerated. I placed pt on brief hold and called [facility]. I explained what happened and explained the box is still sealed and refrigerated and they said they can accept it and they will back track in their system that he \"did not\" pick it up so that he can get his 1.7mg pens at [facility]. I explained to pt who verbalized understanding and will return it and then reach out to [facility].  "

## 2024-08-20 NOTE — TELEPHONE ENCOUNTER
PA for Semaglutide-Weight Management (WEGOVY) 2.4 MG/0.75ML appealed via     []CMM  []SS  []Letter sent to insurance via fax   [x]Phone call to provide updated weight - [id number]  24 hour turnaround time for appeals per insurance    All necessary records sent. Will await response from insurance company    Turnaround time for a decision to be made on an appeal could take up to 30 business days

## 2024-08-20 NOTE — TELEPHONE ENCOUNTER
PA Appeal for WEGOVY 2.4MG/0.75ML APPROVED     Date(s) approved: 06/20/2024 - 08/19/2025     Case # [id number]    Patient advised by          [x]MyChart Message-Communication consent incomplete  []Phone call   []LMOM  []L/M to call office as no active Communication consent on file  []Unable to leave detailed message as VM not approved on Communication consent       Pharmacy advised by    [x]Fax  []Phone call    Message sent to office clinical pool Yes    Approval letter scanned into Media Yes

## 2024-08-23 ENCOUNTER — RA CDI HCC (OUTPATIENT)
Dept: OTHER | Facility: HOSPITAL | Age: 44
End: 2024-08-23

## 2024-08-30 ENCOUNTER — APPOINTMENT (OUTPATIENT)
Dept: LAB | Facility: CLINIC | Age: 44
End: 2024-08-30
Payer: COMMERCIAL

## 2024-08-30 ENCOUNTER — OFFICE VISIT (OUTPATIENT)
Dept: FAMILY MEDICINE CLINIC | Facility: CLINIC | Age: 44
End: 2024-08-30
Payer: COMMERCIAL

## 2024-08-30 VITALS
TEMPERATURE: 98.3 F | DIASTOLIC BLOOD PRESSURE: 70 MMHG | HEIGHT: 70 IN | BODY MASS INDEX: 35.42 KG/M2 | SYSTOLIC BLOOD PRESSURE: 110 MMHG | HEART RATE: 82 BPM | WEIGHT: 247.4 LBS | OXYGEN SATURATION: 97 %

## 2024-08-30 DIAGNOSIS — R73.03 PREDIABETES: ICD-10-CM

## 2024-08-30 DIAGNOSIS — E78.1 HYPERTRIGLYCERIDEMIA: Primary | ICD-10-CM

## 2024-08-30 DIAGNOSIS — E78.1 HYPERTRIGLYCERIDEMIA: ICD-10-CM

## 2024-08-30 LAB
ALBUMIN SERPL BCG-MCNC: 4.5 G/DL (ref 3.5–5)
ALP SERPL-CCNC: 67 U/L (ref 34–104)
ALT SERPL W P-5'-P-CCNC: 47 U/L (ref 7–52)
ANION GAP SERPL CALCULATED.3IONS-SCNC: 11 MMOL/L (ref 4–13)
AST SERPL W P-5'-P-CCNC: 27 U/L (ref 13–39)
BASOPHILS # BLD AUTO: 0.04 THOUSANDS/ÂΜL (ref 0–0.1)
BASOPHILS NFR BLD AUTO: 1 % (ref 0–1)
BILIRUB SERPL-MCNC: 0.55 MG/DL (ref 0.2–1)
BUN SERPL-MCNC: 15 MG/DL (ref 5–25)
CALCIUM SERPL-MCNC: 9.5 MG/DL (ref 8.4–10.2)
CHLORIDE SERPL-SCNC: 102 MMOL/L (ref 96–108)
CHOLEST SERPL-MCNC: 215 MG/DL
CO2 SERPL-SCNC: 25 MMOL/L (ref 21–32)
CREAT SERPL-MCNC: 0.93 MG/DL (ref 0.6–1.3)
EOSINOPHIL # BLD AUTO: 0.14 THOUSAND/ÂΜL (ref 0–0.61)
EOSINOPHIL NFR BLD AUTO: 3 % (ref 0–6)
ERYTHROCYTE [DISTWIDTH] IN BLOOD BY AUTOMATED COUNT: 13.2 % (ref 11.6–15.1)
EST. AVERAGE GLUCOSE BLD GHB EST-MCNC: 111 MG/DL
GFR SERPL CREATININE-BSD FRML MDRD: 98 ML/MIN/1.73SQ M
GLUCOSE P FAST SERPL-MCNC: 98 MG/DL (ref 65–99)
HBA1C MFR BLD: 5.5 %
HCT VFR BLD AUTO: 46.1 % (ref 36.5–49.3)
HDLC SERPL-MCNC: 27 MG/DL
HGB BLD-MCNC: 15.6 G/DL (ref 12–17)
IMM GRANULOCYTES # BLD AUTO: 0.02 THOUSAND/UL (ref 0–0.2)
IMM GRANULOCYTES NFR BLD AUTO: 0 % (ref 0–2)
LDLC SERPL DIRECT ASSAY-MCNC: 64 MG/DL (ref 0–100)
LYMPHOCYTES # BLD AUTO: 1.59 THOUSANDS/ÂΜL (ref 0.6–4.47)
LYMPHOCYTES NFR BLD AUTO: 34 % (ref 14–44)
MCH RBC QN AUTO: 29.1 PG (ref 26.8–34.3)
MCHC RBC AUTO-ENTMCNC: 33.8 G/DL (ref 31.4–37.4)
MCV RBC AUTO: 86 FL (ref 82–98)
MONOCYTES # BLD AUTO: 0.42 THOUSAND/ÂΜL (ref 0.17–1.22)
MONOCYTES NFR BLD AUTO: 9 % (ref 4–12)
NEUTROPHILS # BLD AUTO: 2.44 THOUSANDS/ÂΜL (ref 1.85–7.62)
NEUTS SEG NFR BLD AUTO: 53 % (ref 43–75)
NRBC BLD AUTO-RTO: 0 /100 WBCS
PLATELET # BLD AUTO: 199 THOUSANDS/UL (ref 149–390)
PMV BLD AUTO: 12.3 FL (ref 8.9–12.7)
POTASSIUM SERPL-SCNC: 4.1 MMOL/L (ref 3.5–5.3)
PROT SERPL-MCNC: 7.1 G/DL (ref 6.4–8.4)
RBC # BLD AUTO: 5.36 MILLION/UL (ref 3.88–5.62)
SODIUM SERPL-SCNC: 138 MMOL/L (ref 135–147)
TRIGL SERPL-MCNC: 863 MG/DL
WBC # BLD AUTO: 4.65 THOUSAND/UL (ref 4.31–10.16)

## 2024-08-30 PROCEDURE — 80053 COMPREHEN METABOLIC PANEL: CPT

## 2024-08-30 PROCEDURE — 80061 LIPID PANEL: CPT

## 2024-08-30 PROCEDURE — 36415 COLL VENOUS BLD VENIPUNCTURE: CPT

## 2024-08-30 PROCEDURE — 99214 OFFICE O/P EST MOD 30 MIN: CPT

## 2024-08-30 PROCEDURE — 83036 HEMOGLOBIN GLYCOSYLATED A1C: CPT

## 2024-08-30 PROCEDURE — 83721 ASSAY OF BLOOD LIPOPROTEIN: CPT

## 2024-08-30 PROCEDURE — 85025 COMPLETE CBC W/AUTO DIFF WBC: CPT

## 2024-08-30 NOTE — ASSESSMENT & PLAN NOTE
Patient presents today for a weight management follow up. He has lost about 30 pounds since starting Wegovy in January. He has been doing well with injections and has had no side effects from the Wegovy.  He has had no availability issues with the Wegovy. He is now on the 2.4mg dose, which is the max dose. He has already lost 5 pounds in the past month, so he is consistently losing weight. He has enough refills for the next 3 months, will probably need refill prior to 3 month follow up. Prior auth is already approved.     He will get labs completed today including CBC, CMP, Lipid panel, and A1c. Likely triglycerides has improved as well as A1c. Patient to continue his diet and exercise lifestyle changes as we discussed. He is agreeable with follow-up plan and will let me know if there are any concerns in the meantime.

## 2024-08-30 NOTE — PROGRESS NOTES
"Ambulatory Visit  Name: Tim Garza      : 1980      MRN: [id number]  Encounter Provider: Anila Kimbrough PA-C  Encounter Date: 2024   Encounter department: [facility]    Assessment & Plan   1. BMI 38.0-38.9,adult  Assessment & Plan:  Patient presents today for a weight management follow up. He has lost about 30 pounds since starting Wegovy in January. He has been doing well with injections and has had no side effects from the Wegovy.  He has had no availability issues with the Wegovy. He is now on the 2.4mg dose, which is the max dose. He has already lost 5 pounds in the past month, so he is consistently losing weight. He has enough refills for the next 3 months, will probably need refill prior to 3 month follow up. Prior auth is already approved.     He will get labs completed today including CBC, CMP, Lipid panel, and A1c. Likely triglycerides has improved as well as A1c. Patient to continue his diet and exercise lifestyle changes as we discussed. He is agreeable with follow-up plan and will let me know if there are any concerns in the meantime.   2. Prediabetes  -     Hemoglobin A1C; Future       History of Present Illness     Patient presents today for a weight management follow up. He has lost about 30 pounds still starting Wegovy in January. He has experienced a decrease in \"food noise\", which is helping him with any cravings. He is also keeping up with proper exercise levels, is currently in football season. He has no concerns today.         Review of Systems   Constitutional:  Negative for chills, fatigue and fever.   Respiratory:  Negative for cough, chest tightness and shortness of breath.    Cardiovascular:  Negative for chest pain, palpitations and leg swelling.   Gastrointestinal:  Negative for abdominal pain, constipation, diarrhea, nausea and vomiting.   Neurological:  Negative for dizziness, light-headedness and headaches.       Objective     /70 (BP " "Location: Left arm, Patient Position: Sitting, Cuff Size: Large)   Pulse 82   Temp 98.3 °F (36.8 °C) (Temporal)   Ht 5' 10\" (1.778 m)   Wt 112 kg (247 lb 6.4 oz)   SpO2 97%   BMI 35.50 kg/m²     Physical Exam  Vitals and nursing note reviewed.   Constitutional:       General: He is not in acute distress.     Appearance: Normal appearance. He is normal weight. He is not ill-appearing.   HENT:      Head: Normocephalic and atraumatic.      Right Ear: Tympanic membrane normal.      Left Ear: Tympanic membrane normal.      Nose: Nose normal.      Mouth/Throat:      Mouth: Mucous membranes are moist.      Pharynx: Oropharynx is clear.   Cardiovascular:      Rate and Rhythm: Normal rate and regular rhythm.      Pulses: Normal pulses.      Heart sounds: No murmur heard.  Pulmonary:      Effort: Pulmonary effort is normal. No respiratory distress.      Breath sounds: Normal breath sounds.   Abdominal:      General: Bowel sounds are normal.      Palpations: Abdomen is soft.      Tenderness: There is no abdominal tenderness.   Musculoskeletal:      Cervical back: Normal range of motion.      Right lower leg: No edema.      Left lower leg: No edema.   Lymphadenopathy:      Cervical: No cervical adenopathy.   Skin:     General: Skin is warm and dry.   Neurological:      General: No focal deficit present.      Mental Status: He is alert and oriented to person, place, and time. Mental status is at baseline.   Psychiatric:         Mood and Affect: Mood normal.         Behavior: Behavior normal.         Judgment: Judgment normal.       Administrative Statements   I have spent a total time of 35 minutes in caring for this patient on the day of the visit/encounter including Risks and benefits of tx options, Instructions for management, Patient and family education, Importance of tx compliance, Risk factor reductions, Impressions, Documenting in the medical record, Reviewing / ordering tests, medicine, procedures  , and Obtaining " or reviewing history  .

## 2024-08-31 ENCOUNTER — APPOINTMENT (OUTPATIENT)
Dept: LAB | Facility: CLINIC | Age: 44
End: 2024-08-31
Payer: COMMERCIAL

## 2024-08-31 DIAGNOSIS — E78.1 HYPERTRIGLYCERIDEMIA: ICD-10-CM

## 2024-08-31 LAB — LIPASE SERPL-CCNC: 46 U/L (ref 11–82)

## 2024-08-31 PROCEDURE — 36415 COLL VENOUS BLD VENIPUNCTURE: CPT

## 2024-08-31 PROCEDURE — 83690 ASSAY OF LIPASE: CPT

## 2024-09-01 DIAGNOSIS — E78.1 HYPERTRIGLYCERIDEMIA: Primary | ICD-10-CM

## 2024-09-01 PROBLEM — K80.20 CHOLELITHIASIS: Status: RESOLVED | Noted: 2023-05-09 | Resolved: 2024-09-01

## 2024-09-01 PROBLEM — Z90.49 S/P CHOLECYSTECTOMY: Status: ACTIVE | Noted: 2024-09-01

## 2024-09-01 RX ORDER — FENOFIBRATE 48 MG/1
48 TABLET, COATED ORAL DAILY
Qty: 90 TABLET | Refills: 3 | Status: SHIPPED | OUTPATIENT
Start: 2024-09-01

## 2024-10-01 ENCOUNTER — VBI (OUTPATIENT)
Dept: ADMINISTRATIVE | Facility: OTHER | Age: 44
End: 2024-10-01

## 2024-10-01 NOTE — TELEPHONE ENCOUNTER
10/01/24 1:29 PM     Chart reviewed for CRC: Colonoscopy ; nothing is submitted to the patient's insurance at this time.     Umm Wright MA   [facility]

## 2024-10-07 DIAGNOSIS — R73.03 PREDIABETES: ICD-10-CM

## 2024-10-07 DIAGNOSIS — E78.1 HYPERTRIGLYCERIDEMIA: ICD-10-CM

## 2024-10-09 RX ORDER — SEMAGLUTIDE 2.4 MG/.75ML
INJECTION, SOLUTION SUBCUTANEOUS
Qty: 3 ML | Refills: 2 | Status: SHIPPED | OUTPATIENT
Start: 2024-10-09

## 2024-12-11 DIAGNOSIS — R73.03 PREDIABETES: ICD-10-CM

## 2024-12-11 DIAGNOSIS — E78.1 HYPERTRIGLYCERIDEMIA: ICD-10-CM

## 2024-12-12 RX ORDER — SEMAGLUTIDE 2.4 MG/.75ML
INJECTION, SOLUTION SUBCUTANEOUS
Qty: 3 ML | Refills: 2 | Status: SHIPPED | OUTPATIENT
Start: 2024-12-12

## 2024-12-12 NOTE — TELEPHONE ENCOUNTER
Will refill, but patient is due for office follow up and lab work. Orders for labs already placed, patient to go before his appointment.

## 2024-12-18 ENCOUNTER — RA CDI HCC (OUTPATIENT)
Dept: OTHER | Facility: HOSPITAL | Age: 44
End: 2024-12-18

## 2024-12-21 ENCOUNTER — APPOINTMENT (OUTPATIENT)
Dept: LAB | Facility: CLINIC | Age: 44
End: 2024-12-21
Payer: COMMERCIAL

## 2024-12-21 DIAGNOSIS — E78.1 HYPERTRIGLYCERIDEMIA: ICD-10-CM

## 2024-12-21 LAB
ALBUMIN SERPL BCG-MCNC: 4.5 G/DL (ref 3.5–5)
ALP SERPL-CCNC: 65 U/L (ref 34–104)
ALT SERPL W P-5'-P-CCNC: 37 U/L (ref 7–52)
ANION GAP SERPL CALCULATED.3IONS-SCNC: 10 MMOL/L (ref 4–13)
AST SERPL W P-5'-P-CCNC: 25 U/L (ref 13–39)
BILIRUB SERPL-MCNC: 0.7 MG/DL (ref 0.2–1)
BUN SERPL-MCNC: 11 MG/DL (ref 5–25)
CALCIUM SERPL-MCNC: 9.5 MG/DL (ref 8.4–10.2)
CHLORIDE SERPL-SCNC: 103 MMOL/L (ref 96–108)
CHOLEST SERPL-MCNC: 201 MG/DL (ref ?–200)
CO2 SERPL-SCNC: 25 MMOL/L (ref 21–32)
CREAT SERPL-MCNC: 0.79 MG/DL (ref 0.6–1.3)
GFR SERPL CREATININE-BSD FRML MDRD: 107 ML/MIN/1.73SQ M
GLUCOSE P FAST SERPL-MCNC: 108 MG/DL (ref 65–99)
HDLC SERPL-MCNC: 29 MG/DL
LDLC SERPL DIRECT ASSAY-MCNC: 68 MG/DL (ref 0–100)
POTASSIUM SERPL-SCNC: 4.3 MMOL/L (ref 3.5–5.3)
PROT SERPL-MCNC: 7.1 G/DL (ref 6.4–8.4)
SODIUM SERPL-SCNC: 138 MMOL/L (ref 135–147)
TRIGL SERPL-MCNC: 736 MG/DL (ref ?–150)

## 2024-12-21 PROCEDURE — 36415 COLL VENOUS BLD VENIPUNCTURE: CPT

## 2024-12-21 PROCEDURE — 83721 ASSAY OF BLOOD LIPOPROTEIN: CPT

## 2024-12-21 PROCEDURE — 80061 LIPID PANEL: CPT

## 2024-12-21 PROCEDURE — 80053 COMPREHEN METABOLIC PANEL: CPT

## 2024-12-22 ENCOUNTER — RESULTS FOLLOW-UP (OUTPATIENT)
Dept: FAMILY MEDICINE CLINIC | Facility: CLINIC | Age: 44
End: 2024-12-22

## 2024-12-27 ENCOUNTER — OFFICE VISIT (OUTPATIENT)
Dept: FAMILY MEDICINE CLINIC | Facility: CLINIC | Age: 44
End: 2024-12-27
Payer: COMMERCIAL

## 2024-12-27 VITALS
BODY MASS INDEX: 35.07 KG/M2 | HEIGHT: 70 IN | TEMPERATURE: 97 F | DIASTOLIC BLOOD PRESSURE: 76 MMHG | HEART RATE: 75 BPM | OXYGEN SATURATION: 98 % | SYSTOLIC BLOOD PRESSURE: 120 MMHG | WEIGHT: 245 LBS

## 2024-12-27 DIAGNOSIS — Z13.29 SCREENING FOR THYROID DISORDER: ICD-10-CM

## 2024-12-27 DIAGNOSIS — R73.03 PREDIABETES: ICD-10-CM

## 2024-12-27 DIAGNOSIS — E78.1 HYPERTRIGLYCERIDEMIA: ICD-10-CM

## 2024-12-27 DIAGNOSIS — E66.811 CLASS 1 OBESITY DUE TO EXCESS CALORIES WITH BODY MASS INDEX (BMI) OF 34.0 TO 34.9 IN ADULT, UNSPECIFIED WHETHER SERIOUS COMORBIDITY PRESENT: Primary | ICD-10-CM

## 2024-12-27 DIAGNOSIS — E66.09 CLASS 1 OBESITY DUE TO EXCESS CALORIES WITH BODY MASS INDEX (BMI) OF 34.0 TO 34.9 IN ADULT, UNSPECIFIED WHETHER SERIOUS COMORBIDITY PRESENT: Primary | ICD-10-CM

## 2024-12-27 PROCEDURE — 99214 OFFICE O/P EST MOD 30 MIN: CPT

## 2024-12-27 RX ORDER — FENOFIBRATE 54 MG/1
54 TABLET ORAL DAILY
Qty: 90 TABLET | Refills: 1 | Status: SHIPPED | OUTPATIENT
Start: 2024-12-27

## 2024-12-27 RX ORDER — FENOFIBRATE 48 MG/1
48 TABLET, COATED ORAL DAILY
Qty: 90 TABLET | Refills: 3 | Status: CANCELLED | OUTPATIENT
Start: 2024-12-27

## 2024-12-27 NOTE — ASSESSMENT & PLAN NOTE
Will increase fenofibrate to 54 mg.  Triglycerides are improved from last check however still high in the 700s.  Patient is aware of signs and symptoms of pancreatitis to look out for.  Orders:  •  fenofibrate (TRICOR) 54 MG tablet; Take 1 tablet (54 mg total) by mouth daily  •  Lipid Panel with Direct LDL reflex; Future  •  LDL cholesterol, direct; Future

## 2024-12-27 NOTE — PROGRESS NOTES
Name: Tim Garza      : 1980      MRN: [id number]  Encounter Provider: Anila Kimbrough PA-C  Encounter Date: 2024   Encounter department: [facility]  :  Assessment & Plan  Class 1 obesity due to excess calories with body mass index (BMI) of 34.0 to 34.9 in adult, unspecified whether serious comorbidity present  Prior Authorization Clinical Questions for Weight Management Pharmacotherapy    2. Does the patient have a diagnosis of obesity, confirmed by a BMI greater than or equal to 30 kg/m^2?: Yes  3. Does the patient have a BMI of greater than or equal to 27 kg/m^2 with at least one weight-related comorbidity/risk factor/complication (e.g. diabetes, dyslipidemia, coronary artery disease)?: Yes  7. Does the patient have a history of type 2 diabetes?: No  For renewals: Has the patient had a positive outcome with current weight management medication (i.e., change in body weight of at least 4-5% after 12-16 weeks on maximally tolerated dose)?: Yes     Baseline weight (in pounds): 271 lbs  Current weight (in pounds): 245 lbs  Weight loss percentage: -9.59%     Patient doing well with Wegovy 2.4 mg.  At this time has had about a 10% loss of his baseline weight according to current office weight.  Reports no side effects from medication.  Will continue with current treatment.  Continue 3-month follow-ups with fasting lab work prior to make sure triglyceride level is stable.  Encouraged healthy diet and he will increase exercise in the new year.  As patient has had a bit of a stagnant weight loss in the past 3 months, suspect this to be related to lack of exercise at this time.  Patient will work on this so we will continue 3-month follow-ups to check-in.  If he remains stagnant for 6 to 9 months, may need to consider switching to Zepbound.  Reach out with any concerns prior to 3-month follow-up.         Hypertriglyceridemia  Will increase fenofibrate to 54 mg.  Triglycerides are  "improved from last check however still high in the 700s.  Patient is aware of signs and symptoms of pancreatitis to look out for.  Orders:  •  fenofibrate (TRICOR) 54 MG tablet; Take 1 tablet (54 mg total) by mouth daily  •  Lipid Panel with Direct LDL reflex; Future  •  LDL cholesterol, direct; Future    Prediabetes  Continue to monitor A1c.  Orders:  •  Hemoglobin A1C; Future  •  Comprehensive metabolic panel; Future    Screening for thyroid disorder    Orders:  •  TSH, 3rd generation with Free T4 reflex; Future    Skin lesion evaluated today of the left clavicular area.  Suspect this to be inflamed age spot.  It does also feel fluid-filled/blisterlike.  At this time no signs of infection or concern for skin cancer however asked patient to continue to monitor area and can use hydrocortisone cream as needed for pruritus.       History of Present Illness     Patient presents for weight management follow-up.  Reports doing well with medication.  Admits to not exercising as much as he should but will work on this in the new year.  Concerned about skin lesion on upper left clavicle area, has been there for few months and at times can get itchy.      Review of Systems   Constitutional:  Negative for chills, fatigue and fever.   Respiratory:  Negative for cough, chest tightness and shortness of breath.    Cardiovascular:  Negative for chest pain, palpitations and leg swelling.   Skin:         Lesion   Neurological:  Negative for dizziness, light-headedness and headaches.       Objective   /76   Pulse 75   Temp (!) 97 °F (36.1 °C)   Ht 5' 10.25\" (1.784 m)   Wt 111 kg (245 lb)   SpO2 98%   BMI 34.90 kg/m²      Physical Exam  Vitals and nursing note reviewed.   Constitutional:       General: He is not in acute distress.     Appearance: Normal appearance. He is normal weight. He is not ill-appearing.   HENT:      Head: Normocephalic and atraumatic.   Neck:        Comments: Fluid-filled blister like lesion with no " signs of infection or drainage.  Cardiovascular:      Rate and Rhythm: Normal rate and regular rhythm.      Pulses: Normal pulses.      Heart sounds: No murmur heard.  Pulmonary:      Effort: Pulmonary effort is normal. No respiratory distress.      Breath sounds: Normal breath sounds.   Neurological:      General: No focal deficit present.      Mental Status: He is alert and oriented to person, place, and time. Mental status is at baseline.   Psychiatric:         Mood and Affect: Mood normal.         Behavior: Behavior normal.         Judgment: Judgment normal.

## 2024-12-27 NOTE — ASSESSMENT & PLAN NOTE
Continue to monitor A1c.  Orders:  •  Hemoglobin A1C; Future  •  Comprehensive metabolic panel; Future

## 2025-02-01 DIAGNOSIS — E78.1 HYPERTRIGLYCERIDEMIA: ICD-10-CM

## 2025-02-01 DIAGNOSIS — R73.03 PREDIABETES: ICD-10-CM

## 2025-02-03 RX ORDER — SEMAGLUTIDE 2.4 MG/.75ML
2.4 INJECTION, SOLUTION SUBCUTANEOUS WEEKLY
Qty: 3 ML | Refills: 2 | Status: SHIPPED | OUTPATIENT
Start: 2025-02-03

## 2025-02-21 ENCOUNTER — APPOINTMENT (OUTPATIENT)
Dept: RADIOLOGY | Facility: CLINIC | Age: 45
End: 2025-02-21
Payer: COMMERCIAL

## 2025-02-21 ENCOUNTER — OFFICE VISIT (OUTPATIENT)
Dept: FAMILY MEDICINE CLINIC | Facility: CLINIC | Age: 45
End: 2025-02-21
Payer: COMMERCIAL

## 2025-02-21 VITALS
OXYGEN SATURATION: 98 % | DIASTOLIC BLOOD PRESSURE: 78 MMHG | TEMPERATURE: 98.4 F | WEIGHT: 250.4 LBS | HEART RATE: 84 BPM | BODY MASS INDEX: 35.67 KG/M2 | SYSTOLIC BLOOD PRESSURE: 114 MMHG

## 2025-02-21 DIAGNOSIS — M25.511 ACUTE PAIN OF RIGHT SHOULDER: Primary | ICD-10-CM

## 2025-02-21 DIAGNOSIS — M25.511 ACUTE PAIN OF RIGHT SHOULDER: ICD-10-CM

## 2025-02-21 PROCEDURE — 99214 OFFICE O/P EST MOD 30 MIN: CPT

## 2025-02-21 PROCEDURE — 73030 X-RAY EXAM OF SHOULDER: CPT

## 2025-02-21 RX ORDER — METHOCARBAMOL 500 MG/1
500 TABLET, FILM COATED ORAL 4 TIMES DAILY
Qty: 30 TABLET | Refills: 0 | Status: SHIPPED | OUTPATIENT
Start: 2025-02-21

## 2025-02-21 RX ORDER — MELOXICAM 15 MG/1
15 TABLET ORAL DAILY PRN
Qty: 30 TABLET | Refills: 0 | Status: SHIPPED | OUTPATIENT
Start: 2025-02-21

## 2025-02-21 NOTE — PROGRESS NOTES
Name: Tim Garza      : 1980      MRN: [id number]  Encounter Provider: Anila Kimbrough PA-C  Encounter Date: 2025   Encounter department: [facility]  :  Assessment & Plan  Acute pain of right shoulder  Suspect rotator cuff etiology of patient's right shoulder pain.  Patient has deficits with external rotation, abduction and forward flexion.  Will start treatment with meloxicam for anti-inflammatory benefit as well as methocarbamol.  Recommend he start with meloxicam especially at nighttime to help with pain during sleep.  If this is not successful to help him sleep, he may certainly trial the methocarbamol but should be mindful of any side effects which we reviewed today.  Will start with shoulder x-ray to rule out arthritic cause of pain.  If pain persist into middle next week, will consider tramadol as we know this worked well in the past for him with knee pain.  Recommended starting home exercise program to help restrengthen rotator cuff.  If the above regimen is not beneficial for pain in about 2 to 3 weeks, I would recommend MRI of the shoulder as well as possible PT referral.  Patient is agreeable with plan today, will follow-up as needed.  I will be in touch with x-ray results.  Orders:  •  XR shoulder 2+ vw right; Future  •  meloxicam (MOBIC) 15 mg tablet; Take 1 tablet (15 mg total) by mouth daily as needed for moderate pain  •  methocarbamol (ROBAXIN) 500 mg tablet; Take 1 tablet (500 mg total) by mouth 4 (four) times a day Muscle spasms           History of Present Illness   Patient presents today with concerns of right shoulder pain that has been present for about a month.  Reports that when pain first started he was at the gym lifting dumbbells above his head when he felt pain shoot and when he was coming down from an extension.  He reports pain can extend down into the biceps area of the right arm.  He reports some numbness and tingling at nighttime, which  tends to be when the pain is worse.  He reports left shoulder is feeling fine with normal range of motion.  He has not tried anything for pain yet but has stopped going to the gym due to his pain level.      Review of Systems   Musculoskeletal:  Positive for arthralgias (right shoulder pain).       Objective   /78 (BP Location: Left arm, Patient Position: Sitting, Cuff Size: Large)   Pulse 84   Temp 98.4 °F (36.9 °C) (Temporal)   Wt 114 kg (250 lb 6.4 oz)   SpO2 98%   BMI 35.67 kg/m²      Physical Exam  Vitals and nursing note reviewed.   Constitutional:       General: He is not in acute distress.     Appearance: Normal appearance. He is not ill-appearing.   HENT:      Head: Normocephalic and atraumatic.   Pulmonary:      Effort: Pulmonary effort is normal. No respiratory distress.   Musculoskeletal:      Right shoulder: Tenderness present. No swelling, deformity or crepitus. Decreased range of motion. Normal strength. Normal pulse.      Left shoulder: Normal.      Comments: Pain with abduction, external rotation, and forward flexion.  Positive empty can test.  Able to extend arm behind back.  No neurological deficits.  Normal pulses.  Normal  strength bilaterally.   Skin:     Coloration: Skin is not cyanotic or pale.   Neurological:      General: No focal deficit present.      Mental Status: He is alert and oriented to person, place, and time.   Psychiatric:         Mood and Affect: Mood normal.         Behavior: Behavior normal.         Judgment: Judgment normal.

## 2025-02-23 ENCOUNTER — RESULTS FOLLOW-UP (OUTPATIENT)
Dept: FAMILY MEDICINE CLINIC | Facility: CLINIC | Age: 45
End: 2025-02-23

## 2025-02-26 DIAGNOSIS — M25.511 ACUTE PAIN OF RIGHT SHOULDER: Primary | ICD-10-CM

## 2025-02-26 RX ORDER — TRAMADOL HYDROCHLORIDE 50 MG/1
50 TABLET ORAL EVERY 8 HOURS PRN
Qty: 20 TABLET | Refills: 0 | Status: SHIPPED | OUTPATIENT
Start: 2025-02-26

## 2025-03-03 DIAGNOSIS — M25.511 ACUTE PAIN OF RIGHT SHOULDER: ICD-10-CM

## 2025-03-04 RX ORDER — METHOCARBAMOL 500 MG/1
500 TABLET, FILM COATED ORAL 4 TIMES DAILY
Qty: 30 TABLET | Refills: 0 | Status: SHIPPED | OUTPATIENT
Start: 2025-03-04

## 2025-03-11 DIAGNOSIS — E78.1 HYPERTRIGLYCERIDEMIA: ICD-10-CM

## 2025-03-11 DIAGNOSIS — R73.03 PREDIABETES: ICD-10-CM

## 2025-03-12 RX ORDER — SEMAGLUTIDE 2.4 MG/.75ML
2.4 INJECTION, SOLUTION SUBCUTANEOUS WEEKLY
Qty: 3 ML | Refills: 2 | Status: SHIPPED | OUTPATIENT
Start: 2025-03-12

## 2025-03-13 DIAGNOSIS — M25.511 ACUTE PAIN OF RIGHT SHOULDER: ICD-10-CM

## 2025-03-14 DIAGNOSIS — M25.511 ACUTE PAIN OF RIGHT SHOULDER: ICD-10-CM

## 2025-03-14 RX ORDER — METHOCARBAMOL 500 MG/1
500 TABLET, FILM COATED ORAL 4 TIMES DAILY
Qty: 30 TABLET | Refills: 0 | OUTPATIENT
Start: 2025-03-14

## 2025-03-14 RX ORDER — TRAMADOL HYDROCHLORIDE 50 MG/1
50 TABLET ORAL EVERY 8 HOURS PRN
Qty: 20 TABLET | Refills: 0 | Status: SHIPPED | OUTPATIENT
Start: 2025-03-14

## 2025-03-14 RX ORDER — MELOXICAM 15 MG/1
15 TABLET ORAL DAILY PRN
Qty: 30 TABLET | Refills: 0 | Status: SHIPPED | OUTPATIENT
Start: 2025-03-14

## 2025-03-14 RX ORDER — TRAMADOL HYDROCHLORIDE 50 MG/1
50 TABLET ORAL EVERY 8 HOURS PRN
Qty: 20 TABLET | Refills: 0 | OUTPATIENT
Start: 2025-03-14

## 2025-03-14 RX ORDER — MELOXICAM 15 MG/1
15 TABLET ORAL DAILY PRN
Qty: 30 TABLET | Refills: 0 | OUTPATIENT
Start: 2025-03-14

## 2025-03-14 NOTE — TELEPHONE ENCOUNTER
I sent in meloxicam and tramadol to the pharmacy for him.  I recently sent in Robaxin on 3/4, he should not need a refill of that yet, this may be a duplicate request.

## 2025-03-27 DIAGNOSIS — M25.511 ACUTE PAIN OF RIGHT SHOULDER: ICD-10-CM

## 2025-03-28 DIAGNOSIS — M25.511 ACUTE PAIN OF RIGHT SHOULDER: ICD-10-CM

## 2025-03-28 RX ORDER — METHOCARBAMOL 500 MG/1
500 TABLET, FILM COATED ORAL 4 TIMES DAILY
Qty: 30 TABLET | Refills: 0 | Status: SHIPPED | OUTPATIENT
Start: 2025-03-28

## 2025-03-28 RX ORDER — TRAMADOL HYDROCHLORIDE 50 MG/1
50 TABLET ORAL EVERY 8 HOURS PRN
Qty: 20 TABLET | Refills: 0 | Status: SHIPPED | OUTPATIENT
Start: 2025-03-28

## 2025-03-31 ENCOUNTER — TELEPHONE (OUTPATIENT)
Age: 45
End: 2025-03-31

## 2025-03-31 NOTE — TELEPHONE ENCOUNTER
PA for traMADol (ULTRAM) 50 mg tablet SUBMITTED to     via    [x]CMM-KEY:  [id number]  []Surescripts-Case ID #   []Availity-Auth ID # NDC #   []Faxed to plan   []Other website   []Phone call Case ID #     [x]PA sent as URGENT    All office notes, labs and other pertaining documents and studies sent. Clinical questions answered. Awaiting determination from insurance company.     Turnaround time for your insurance to make a decision on your Prior Authorization can take 7-21 business days.

## 2025-04-04 NOTE — TELEPHONE ENCOUNTER
PA for traMADol (ULTRAM) 50 mg tablet  DENIED    Reason:      Message sent to office clinical pool Yes    Denial letter scanned into Media Yes    Appeal started No (Provider will need to decide if appeal is warranted and send clinical documentation to Prior Authorization Team for initiation.)    **Please follow up with your patient regarding denial and next steps**

## 2025-04-07 NOTE — TELEPHONE ENCOUNTER
Please let patient know his refill of tramadol was denied by insurance.  He can pay out-of-pocket for it if he wishes.  If not, it looks like he will need to be seen for a follow-up appointment so we can document his success with tramadol to see if insurance will cover it.  Ideally, he should not be on this medication for much longer.  If his pain is still persistent for another month, he should be seen by either an orthopedist or physical therapist.

## 2025-04-07 NOTE — TELEPHONE ENCOUNTER
Pt returned call relayed the chart note from Winifred he understood. And replied the level of pain on his shoulders have decreased. If increases by any chance he will schedule an visit to be seen and discuss further. Thanks

## 2025-04-09 DIAGNOSIS — E78.1 HYPERTRIGLYCERIDEMIA: ICD-10-CM

## 2025-04-10 RX ORDER — FENOFIBRATE 54 MG/1
54 TABLET ORAL DAILY
Qty: 90 TABLET | Refills: 1 | Status: SHIPPED | OUTPATIENT
Start: 2025-04-10

## 2025-05-31 DIAGNOSIS — M25.511 ACUTE PAIN OF RIGHT SHOULDER: ICD-10-CM

## 2025-06-01 RX ORDER — MELOXICAM 15 MG/1
15 TABLET ORAL DAILY PRN
Qty: 30 TABLET | Refills: 0 | Status: SHIPPED | OUTPATIENT
Start: 2025-06-01

## 2025-06-05 ENCOUNTER — VBI (OUTPATIENT)
Dept: ADMINISTRATIVE | Facility: OTHER | Age: 45
End: 2025-06-05

## 2025-06-05 NOTE — TELEPHONE ENCOUNTER
06/05/25 1:39 PM     Chart reviewed for CRC: Colonoscopy was/were not submitted to the patient's insurance.     Cristiane Escobedo MA   [facility]

## 2025-06-13 DIAGNOSIS — R73.03 PREDIABETES: ICD-10-CM

## 2025-06-13 DIAGNOSIS — M25.511 ACUTE PAIN OF RIGHT SHOULDER: ICD-10-CM

## 2025-06-13 DIAGNOSIS — E78.1 HYPERTRIGLYCERIDEMIA: ICD-10-CM

## 2025-06-16 RX ORDER — SEMAGLUTIDE 2.4 MG/.75ML
2.4 INJECTION, SOLUTION SUBCUTANEOUS WEEKLY
Qty: 3 ML | Refills: 0 | Status: SHIPPED | OUTPATIENT
Start: 2025-06-16 | End: 2025-06-19 | Stop reason: SDUPTHER

## 2025-06-16 RX ORDER — TRAMADOL HYDROCHLORIDE 50 MG/1
50 TABLET ORAL EVERY 8 HOURS PRN
Qty: 20 TABLET | Refills: 0 | Status: SHIPPED | OUTPATIENT
Start: 2025-06-16

## 2025-06-17 ENCOUNTER — APPOINTMENT (OUTPATIENT)
Dept: LAB | Facility: CLINIC | Age: 45
End: 2025-06-17
Payer: COMMERCIAL

## 2025-06-17 DIAGNOSIS — E78.1 HYPERTRIGLYCERIDEMIA: ICD-10-CM

## 2025-06-17 DIAGNOSIS — R73.03 PREDIABETES: ICD-10-CM

## 2025-06-17 DIAGNOSIS — Z13.29 SCREENING FOR THYROID DISORDER: ICD-10-CM

## 2025-06-17 LAB
ALBUMIN SERPL BCG-MCNC: 4.6 G/DL (ref 3.5–5)
ALP SERPL-CCNC: 70 U/L (ref 34–104)
ALT SERPL W P-5'-P-CCNC: 25 U/L (ref 7–52)
ANION GAP SERPL CALCULATED.3IONS-SCNC: 11 MMOL/L (ref 4–13)
AST SERPL W P-5'-P-CCNC: 23 U/L (ref 13–39)
BILIRUB SERPL-MCNC: 0.43 MG/DL (ref 0.2–1)
BUN SERPL-MCNC: 9 MG/DL (ref 5–25)
CALCIUM SERPL-MCNC: 9.4 MG/DL (ref 8.4–10.2)
CHLORIDE SERPL-SCNC: 103 MMOL/L (ref 96–108)
CHOLEST SERPL-MCNC: 201 MG/DL (ref ?–200)
CO2 SERPL-SCNC: 24 MMOL/L (ref 21–32)
CREAT SERPL-MCNC: 0.72 MG/DL (ref 0.6–1.3)
EST. AVERAGE GLUCOSE BLD GHB EST-MCNC: 111 MG/DL
GFR SERPL CREATININE-BSD FRML MDRD: 111 ML/MIN/1.73SQ M
GLUCOSE P FAST SERPL-MCNC: 88 MG/DL (ref 65–99)
HBA1C MFR BLD: 5.5 %
HDLC SERPL-MCNC: 32 MG/DL
LDLC SERPL DIRECT ASSAY-MCNC: 64 MG/DL (ref 0–100)
POTASSIUM SERPL-SCNC: 3.9 MMOL/L (ref 3.5–5.3)
PROT SERPL-MCNC: 7.1 G/DL (ref 6.4–8.4)
SODIUM SERPL-SCNC: 138 MMOL/L (ref 135–147)
TRIGL SERPL-MCNC: 717 MG/DL (ref ?–150)
TSH SERPL DL<=0.05 MIU/L-ACNC: 1.02 UIU/ML (ref 0.45–4.5)

## 2025-06-17 PROCEDURE — 83721 ASSAY OF BLOOD LIPOPROTEIN: CPT

## 2025-06-17 PROCEDURE — 36415 COLL VENOUS BLD VENIPUNCTURE: CPT

## 2025-06-17 PROCEDURE — 83036 HEMOGLOBIN GLYCOSYLATED A1C: CPT

## 2025-06-17 PROCEDURE — 84443 ASSAY THYROID STIM HORMONE: CPT

## 2025-06-17 PROCEDURE — 80061 LIPID PANEL: CPT

## 2025-06-17 PROCEDURE — 80053 COMPREHEN METABOLIC PANEL: CPT

## 2025-06-18 ENCOUNTER — RESULTS FOLLOW-UP (OUTPATIENT)
Dept: FAMILY MEDICINE CLINIC | Facility: CLINIC | Age: 45
End: 2025-06-18

## 2025-06-19 ENCOUNTER — OFFICE VISIT (OUTPATIENT)
Dept: FAMILY MEDICINE CLINIC | Facility: CLINIC | Age: 45
End: 2025-06-19
Payer: COMMERCIAL

## 2025-06-19 VITALS
OXYGEN SATURATION: 98 % | BODY MASS INDEX: 33.41 KG/M2 | TEMPERATURE: 97 F | DIASTOLIC BLOOD PRESSURE: 82 MMHG | WEIGHT: 233.4 LBS | HEIGHT: 70 IN | SYSTOLIC BLOOD PRESSURE: 116 MMHG | HEART RATE: 83 BPM

## 2025-06-19 DIAGNOSIS — E66.09 CLASS 1 OBESITY DUE TO EXCESS CALORIES WITH BODY MASS INDEX (BMI) OF 34.0 TO 34.9 IN ADULT, UNSPECIFIED WHETHER SERIOUS COMORBIDITY PRESENT: Primary | ICD-10-CM

## 2025-06-19 DIAGNOSIS — R73.03 PREDIABETES: ICD-10-CM

## 2025-06-19 DIAGNOSIS — E66.811 CLASS 1 OBESITY DUE TO EXCESS CALORIES WITH BODY MASS INDEX (BMI) OF 34.0 TO 34.9 IN ADULT, UNSPECIFIED WHETHER SERIOUS COMORBIDITY PRESENT: Primary | ICD-10-CM

## 2025-06-19 DIAGNOSIS — M25.511 CHRONIC RIGHT SHOULDER PAIN: ICD-10-CM

## 2025-06-19 DIAGNOSIS — E78.1 HYPERTRIGLYCERIDEMIA: ICD-10-CM

## 2025-06-19 DIAGNOSIS — G89.29 CHRONIC RIGHT SHOULDER PAIN: ICD-10-CM

## 2025-06-19 PROCEDURE — 99213 OFFICE O/P EST LOW 20 MIN: CPT

## 2025-06-19 RX ORDER — SEMAGLUTIDE 2.4 MG/.75ML
2.4 INJECTION, SOLUTION SUBCUTANEOUS WEEKLY
Qty: 9 ML | Refills: 1 | Status: SHIPPED | OUTPATIENT
Start: 2025-06-19

## 2025-06-19 NOTE — ASSESSMENT & PLAN NOTE
Continues to have elevated triglycerides on lipid panel.  Admits to not taking fenofibrate daily.  Discussed with him today is imperative that he takes this daily so that we can avoid increasing dose.  He is agreeable with this and will try to take it daily.  Will recheck lipid panel with direct LDL in 3 months, sooner with any concerning symptoms.  No upper abdominal pain, nausea or vomiting.  Orders:  •  Lipid panel; Future  •  LDL cholesterol, direct; Future  •  Semaglutide-Weight Management (Wegovy) 2.4 MG/0.75ML; Inject 0.75 mL (2.4 mg total) under the skin once a week

## 2025-06-19 NOTE — ASSESSMENT & PLAN NOTE
Orders:  •  Semaglutide-Weight Management (Wegovy) 2.4 MG/0.75ML; Inject 0.75 mL (2.4 mg total) under the skin once a week

## 2025-06-19 NOTE — PROGRESS NOTES
Name: Tim Garza      : 1980      MRN: [id number]  Encounter Provider: Anila Kimbrough PA-C  Encounter Date: 2025   Encounter department: [facility]  :  Assessment & Plan  Class 1 obesity due to excess calories with body mass index (BMI) of 34.0 to 34.9 in adult, unspecified whether serious comorbidity present  Prior Authorization Clinical Questions for Weight Management Pharmacotherapy    1. Does the patient have a contrainidcation to medication prescribed for weight management?: No  2. Does the patient have a diagnosis of obesity, confirmed by a BMI greater than or equal to 30 kg/m^2?: Yes  3. Does the patient have a BMI of greater than or equal to 27 kg/m^2 with at least one weight-related comorbidity/risk factor/complication (e.g. diabetes, dyslipidemia, coronary artery disease)?: Yes  4. Weight-related co-morbidities/risk factors: prediabetes, dyslipidemia  7. Has the patient been on a weight loss regimen of low-calorie diet, increased physical activity, and lifestyle modifications for a minimum of 6 months?: Yes  8. Has the patient completed a comprehensive weight loss program (ie, Weight Watchers, Noom, Bariatrics, other lizz on phone)? If so, what?: No  9. Does the patient have a history of type 2 diabetes?: No  10. Has the member tried and failed other weight loss medication within the past 12 months?: No  11. Will the member use requested medication in combination with another GLP agonist or weight loss drug?: No  12. Is the medication a controlled substance?: No  For renewals: Has the patient had a positive outcome with current weight management medication (i.e., change in body weight of at least 4-5% after 12-16 weeks on maximally tolerated dose)?: Yes     Baseline weight (in pounds): 271 lbs  Current weight (in pounds): 233.4 lbs  Weight loss percentage: -13.87%       Patient continues to do well with Wegovy 2.4 mg weekly.  He continues to be without side  effects from medication.  At this time he has lost about 13% of his starting weight.  Will continue to monitor triglycerides closely, at this time encouraged him to take fenofibrate daily, he admits to skipping several doses.  He is eating a healthy diet and is trying to exercise regularly but admits that exercise has not been as great recently due to his right shoulder pain.  Recent lab work reviewed with no major abnormalities besides elevated triglycerides.  Continue medication as prescribed.  Continue 3-month follow-ups.  Will recheck lipid panel in 3 months.       Hypertriglyceridemia  Continues to have elevated triglycerides on lipid panel.  Admits to not taking fenofibrate daily.  Discussed with him today is imperative that he takes this daily so that we can avoid increasing dose.  He is agreeable with this and will try to take it daily.  Will recheck lipid panel with direct LDL in 3 months, sooner with any concerning symptoms.  No upper abdominal pain, nausea or vomiting.  Orders:  •  Lipid panel; Future  •  LDL cholesterol, direct; Future  •  Semaglutide-Weight Management (Wegovy) 2.4 MG/0.75ML; Inject 0.75 mL (2.4 mg total) under the skin once a week    BMI 38.0-38.9,adult    Orders:  •  Semaglutide-Weight Management (Wegovy) 2.4 MG/0.75ML; Inject 0.75 mL (2.4 mg total) under the skin once a week    Prediabetes    Orders:  •  Semaglutide-Weight Management (Wegovy) 2.4 MG/0.75ML; Inject 0.75 mL (2.4 mg total) under the skin once a week    Chronic right shoulder pain  Continue as needed use of tramadol at nighttime.  Reports taking 2 tablets at nighttime if needed.  PDMP reviewed with no red flags.  Does not require refill today but will let me know when he needs next refill.              History of Present Illness   Patient presents today for weight management follow-up.  Reports still doing well with Wegovy with no side effects.  He reports being very happy with his weight loss thus far.  His goal weight  "is to be 215 pounds.  He continues to eat a healthy diet.  His exercise has not been as great lately due to his right shoulder pain.  He has been taking tramadol as needed which is helping at nighttime.  Reports taking 2 tablets at nighttime if needed.      Review of Systems   Constitutional:  Negative for chills, fatigue and fever.   Respiratory:  Negative for cough, chest tightness and shortness of breath.    Cardiovascular:  Negative for chest pain, palpitations and leg swelling.   Musculoskeletal:  Positive for arthralgias.   Neurological:  Negative for dizziness, light-headedness and headaches.       Objective   /82 (BP Location: Left arm, Patient Position: Sitting, Cuff Size: Large)   Pulse 83   Temp (!) 97 °F (36.1 °C) (Temporal)   Ht 5' 10.25\" (1.784 m)   Wt 106 kg (233 lb 6.4 oz)   SpO2 98%   BMI 33.25 kg/m²      Physical Exam  Vitals and nursing note reviewed.   Constitutional:       General: He is not in acute distress.     Appearance: Normal appearance. He is normal weight. He is not ill-appearing.   HENT:      Head: Normocephalic and atraumatic.     Cardiovascular:      Rate and Rhythm: Normal rate and regular rhythm.      Pulses: Normal pulses.      Heart sounds: No murmur heard.  Pulmonary:      Effort: Pulmonary effort is normal. No respiratory distress.      Breath sounds: Normal breath sounds.     Neurological:      General: No focal deficit present.      Mental Status: He is alert and oriented to person, place, and time. Mental status is at baseline.     Psychiatric:         Mood and Affect: Mood normal.         Behavior: Behavior normal.         Judgment: Judgment normal.         "

## 2025-06-19 NOTE — ASSESSMENT & PLAN NOTE
Continue as needed use of tramadol at nighttime.  Reports taking 2 tablets at nighttime if needed.  PDMP reviewed with no red flags.  Does not require refill today but will let me know when he needs next refill.

## 2025-07-05 DIAGNOSIS — M25.511 ACUTE PAIN OF RIGHT SHOULDER: ICD-10-CM

## 2025-07-07 RX ORDER — TRAMADOL HYDROCHLORIDE 50 MG/1
50 TABLET ORAL EVERY 8 HOURS PRN
Qty: 20 TABLET | Refills: 0 | Status: SHIPPED | OUTPATIENT
Start: 2025-07-07

## 2025-08-21 DIAGNOSIS — E78.1 HYPERTRIGLYCERIDEMIA: ICD-10-CM

## 2025-08-22 RX ORDER — FENOFIBRATE 54 MG/1
54 TABLET ORAL DAILY
Qty: 90 TABLET | Refills: 1 | Status: SHIPPED | OUTPATIENT
Start: 2025-08-22

## (undated) DEVICE — INTENDED FOR TISSUE SEPARATION, AND OTHER PROCEDURES THAT REQUIRE A SHARP SURGICAL BLADE TO PUNCTURE OR CUT.: Brand: BARD-PARKER SAFETY BLADES SIZE 11, STERILE

## (undated) DEVICE — BIPOLAR SEALER 23-113-1 AQM 2.3: Brand: AQUAMANTYS™

## (undated) DEVICE — ELECTRODE LAP L WIRE E-Z CLEAN 33CM -0100

## (undated) DEVICE — METZENBAUM ADTEC SINGLE USE DISSECTING SCISSORS, SHAFT ONLY, MONOPOLAR, CURVED TO LEFT, WORKING LENGTH: 12 1/4", (310 MM), DIAM. 5 MM, INSULATED, DOUBLE ACTION, STERILE, DISPOSABLE, PACKAGE OF 10 PIECES: Brand: AESCULAP

## (undated) DEVICE — TRAY FOLEY 16FR URIMETER SURESTEP

## (undated) DEVICE — 3000CC GUARDIAN II: Brand: GUARDIAN

## (undated) DEVICE — NEEDLE HYPO 22G X 1-1/2 IN

## (undated) DEVICE — DECANTER: Brand: UNBRANDED

## (undated) DEVICE — WET SKIN PREP TRAY: Brand: MEDLINE INDUSTRIES, INC.

## (undated) DEVICE — PACK TOTAL KNEE PBDS

## (undated) DEVICE — SURGICAL GOWN, XL SMARTSLEEVE: Brand: CONVERTORS

## (undated) DEVICE — PADDING CAST 6IN COTTON STRL

## (undated) DEVICE — TROCARS: Brand: KII® BALLOON BLUNT TIP SYSTEM

## (undated) DEVICE — NEPTUNE E-SEP SMOKE EVACUATION PENCIL, COATED, 70MM BLADE, PUSH BUTTON SWITCH: Brand: NEPTUNE E-SEP

## (undated) DEVICE — INSUFFLATION TUBING PRIMFLO

## (undated) DEVICE — PROXIMATE SKIN STAPLERS (35 WIDE) CONTAINS 35 STAINLESS STEEL STAPLES (FIXED HEAD): Brand: PROXIMATE

## (undated) DEVICE — COOL TEMP PAD

## (undated) DEVICE — VIOLET BRAIDED (POLYGLACTIN 910), SYNTHETIC ABSORBABLE SUTURE: Brand: COATED VICRYL

## (undated) DEVICE — GLOVE INDICATOR PI UNDERGLOVE SZ 8.5 BLUE

## (undated) DEVICE — SAW BLADE RECIPROCATING 179

## (undated) DEVICE — HOOD: Brand: FLYTE

## (undated) DEVICE — SKIN MARKER DUAL TIP WITH RULER CAP, FLEXIBLE RULER AND LABELS: Brand: DEVON

## (undated) DEVICE — WEBRIL 6 IN UNSTERILE

## (undated) DEVICE — 10FR FRAZIER SUCTION HANDLE: Brand: CARDINAL HEALTH

## (undated) DEVICE — VEST SURGEON DISPOSABLE

## (undated) DEVICE — DRAPE EQUIPMENT RF WAND

## (undated) DEVICE — GAMMEX® NON-LATEX SENSITIVE SIZE 7.5, STERILE NEOPRENE POWDER-FREE SURGICAL GLOVE: Brand: GAMMEX

## (undated) DEVICE — COBAN 6 IN STERILE

## (undated) DEVICE — SCD SEQUENTIAL COMPRESSION COMFORT SLEEVE MEDIUM KNEE LENGTH: Brand: KENDALL SCD

## (undated) DEVICE — THE SIMPULSE SOLO SYSTEM WITH ULTREX RETRACTABLE SPLASH SHIELD TIP: Brand: SIMPULSE SOLO

## (undated) DEVICE — GAUZE SPONGES,16 PLY: Brand: CURITY

## (undated) DEVICE — ABDOMINAL PAD: Brand: DERMACEA

## (undated) DEVICE — PAD GROUNDING ADULT

## (undated) DEVICE — ALLENTOWN LAP CHOLE APP PACK: Brand: CARDINAL HEALTH

## (undated) DEVICE — GLOVE SRG BIOGEL 8.5

## (undated) DEVICE — GLOVE INDICATOR PI UNDERGLOVE SZ 8 BLUE

## (undated) DEVICE — TISSUE RETRIEVAL SYSTEM: Brand: INZII RETRIEVAL SYSTEM

## (undated) DEVICE — IMPERVIOUS STOCKINETTE: Brand: DEROYAL

## (undated) DEVICE — SPONGE LAP 18 X 18 IN

## (undated) DEVICE — SUT VICRYL 2-0 CT-1 36 IN J945H

## (undated) DEVICE — TUBING SMOKE EVAC W/FILTRATION DEVICE PLUMEPORT ACTIV

## (undated) DEVICE — 3M™ STERI-DRAPE™ U-DRAPE 1015: Brand: STERI-DRAPE™

## (undated) DEVICE — HEX-LOCKING BLADE ELECTRODE: Brand: EDGE

## (undated) DEVICE — 3M(TM) TRANSPORE SURGICAL TAPE 1527-1: Brand: 3M™ TRANSPORE™

## (undated) DEVICE — CHLORAPREP HI-LITE 26ML ORANGE

## (undated) DEVICE — PREP SURGICAL PURPREP 26ML

## (undated) DEVICE — 3M™ TEGADERM™ TRANSPARENT FILM DRESSING FRAME STYLE, 1627, 4 IN X 10 IN (10 CM X 25 CM), 20/CT 4CT/CASE: Brand: 3M™ TEGADERM™

## (undated) DEVICE — TROCAR: Brand: KII FIOS FIRST ENTRY

## (undated) DEVICE — LIGAMAX 5 MM ENDOSCOPIC MULTIPLE CLIP APPLIER: Brand: LIGAMAX

## (undated) DEVICE — SUT MONOCRYL 4-0 PS-2 27 IN Y426H

## (undated) DEVICE — TOWEL SURG XR DETECT GREEN STRL RFD

## (undated) DEVICE — SAW BLADE OSCILLATING BRAZOL 167

## (undated) DEVICE — BLUE HEAT SCOPE WARMER

## (undated) DEVICE — GLOVE SRG BIOGEL 6.5

## (undated) DEVICE — SYRINGE 30ML LL

## (undated) DEVICE — GLOVE SRG BIOGEL 7.5

## (undated) DEVICE — SILVER-COATED ANTIMICROBIAL BARRIER DRESSING: Brand: ACTICOAT FLEX7 4" X 5"

## (undated) DEVICE — SUT FIBERWIRE #2 1/2 CIRCLE T-5 38IN AR-7200

## (undated) DEVICE — SUT VICRYL 0 UR-6 27 IN J603H

## (undated) DEVICE — BANDAGE ACE VELCRO 6 IN LF

## (undated) DEVICE — GLOVE SRG BIOGEL ECLIPSE 8

## (undated) DEVICE — GLOVE SRG BIOGEL ORTHOPEDIC 7.5

## (undated) DEVICE — TROCAR: Brand: KII® SLEEVE

## (undated) DEVICE — ADHESIVE SKIN HIGH VISCOSITY EXOFIN 1ML

## (undated) DEVICE — 3M™ IOBAN™ 2 ANTIMICROBIAL INCISE DRAPE 6648EZ: Brand: IOBAN™ 2

## (undated) DEVICE — 3M™ DURAPORE™ SURGICAL TAPE 1538-3, 3 INCH X 10 YARD (7,5CM X 9,1M), 4 ROLLS/BOX: Brand: 3M™ DURAPORE™

## (undated) DEVICE — INTENDED FOR TISSUE SEPARATION, AND OTHER PROCEDURES THAT REQUIRE A SHARP SURGICAL BLADE TO PUNCTURE OR CUT.: Brand: BARD-PARKER SAFETY BLADES SIZE 10, STERILE

## (undated) DEVICE — ELECTRODE BLADE MOD E-Z CLEAN  2.75IN 7CM -0012AM

## (undated) DEVICE — GLOVE INDICATOR PI UNDERGLOVE SZ 6.5 BLUE

## (undated) DEVICE — CUFF TOURNIQUET 30 X 4 IN QUICK CONNECT DISP 1BLA

## (undated) DEVICE — CEMENT MIXING CARTRIDGE PRISM II

## (undated) DEVICE — GLOVE SRG BIOGEL 8